# Patient Record
Sex: FEMALE | Race: WHITE | NOT HISPANIC OR LATINO | Employment: OTHER | URBAN - METROPOLITAN AREA
[De-identification: names, ages, dates, MRNs, and addresses within clinical notes are randomized per-mention and may not be internally consistent; named-entity substitution may affect disease eponyms.]

---

## 2017-10-25 ENCOUNTER — TRANSCRIBE ORDERS (OUTPATIENT)
Dept: ADMINISTRATIVE | Facility: HOSPITAL | Age: 62
End: 2017-10-25

## 2017-10-25 DIAGNOSIS — Z12.31 VISIT FOR SCREENING MAMMOGRAM: Primary | ICD-10-CM

## 2017-11-10 ENCOUNTER — HOSPITAL ENCOUNTER (OUTPATIENT)
Dept: MAMMOGRAPHY | Facility: HOSPITAL | Age: 62
Discharge: HOME | End: 2017-11-10
Attending: INTERNAL MEDICINE | Admitting: INTERNAL MEDICINE

## 2017-11-10 DIAGNOSIS — Z12.31 VISIT FOR SCREENING MAMMOGRAM: ICD-10-CM

## 2017-11-10 PROCEDURE — G0202 SCR MAMMO BI INCL CAD: HCPCS

## 2017-11-10 PROCEDURE — 77063 BREAST TOMOSYNTHESIS BI: CPT

## 2017-11-13 PROCEDURE — 77067 SCR MAMMO BI INCL CAD: CPT | Performed by: RADIOLOGY

## 2017-11-13 PROCEDURE — 77063 BREAST TOMOSYNTHESIS BI: CPT | Performed by: RADIOLOGY

## 2018-10-09 ENCOUNTER — TRANSCRIBE ORDERS (OUTPATIENT)
Dept: ADMINISTRATIVE | Facility: HOSPITAL | Age: 63
End: 2018-10-09

## 2018-10-09 DIAGNOSIS — Z12.31 VISIT FOR SCREENING MAMMOGRAM: Primary | ICD-10-CM

## 2018-11-13 ENCOUNTER — HOSPITAL ENCOUNTER (OUTPATIENT)
Dept: MAMMOGRAPHY | Facility: HOSPITAL | Age: 63
Discharge: HOME | End: 2018-11-13
Attending: OBSTETRICS & GYNECOLOGY | Admitting: OBSTETRICS & GYNECOLOGY

## 2018-11-13 DIAGNOSIS — Z12.31 VISIT FOR SCREENING MAMMOGRAM: ICD-10-CM

## 2018-11-13 PROCEDURE — 77063 BREAST TOMOSYNTHESIS BI: CPT

## 2018-11-13 PROCEDURE — 77067 SCR MAMMO BI INCL CAD: CPT

## 2018-11-13 PROCEDURE — 77067 SCR MAMMO BI INCL CAD: CPT | Performed by: RADIOLOGY

## 2018-11-13 PROCEDURE — 77063 BREAST TOMOSYNTHESIS BI: CPT | Performed by: RADIOLOGY

## 2019-02-15 ENCOUNTER — TELEPHONE (OUTPATIENT)
Dept: INTERNAL MEDICINE | Facility: CLINIC | Age: 64
End: 2019-02-15

## 2019-02-15 DIAGNOSIS — R73.03 PREDIABETES: ICD-10-CM

## 2019-02-15 DIAGNOSIS — E78.2 MIXED HYPERLIPIDEMIA: ICD-10-CM

## 2019-02-15 DIAGNOSIS — I10 BENIGN ESSENTIAL HYPERTENSION: Primary | ICD-10-CM

## 2019-02-15 NOTE — TELEPHONE ENCOUNTER
Reason for Call: FUTURE LABS    Symptoms:     Onset (when it began):    Have they tried anything?    Other pertinent info:  PT HAS PHYSICAL ON 02/22/19

## 2019-02-20 PROBLEM — F51.01 PRIMARY INSOMNIA: Status: ACTIVE | Noted: 2019-02-20

## 2019-02-20 PROBLEM — J45.909 ASTHMA: Status: ACTIVE | Noted: 2019-02-20

## 2019-02-20 PROBLEM — L65.9 LOSS OF HAIR: Status: ACTIVE | Noted: 2019-02-20

## 2019-02-20 PROBLEM — J30.9 AR (ALLERGIC RHINITIS): Status: ACTIVE | Noted: 2019-02-20

## 2019-02-20 PROBLEM — E78.2 MIXED HYPERLIPIDEMIA: Status: ACTIVE | Noted: 2019-02-20

## 2019-02-20 PROBLEM — T78.40XA ALLERGIC REACTION: Status: ACTIVE | Noted: 2019-02-20

## 2019-02-20 PROBLEM — I10 BENIGN ESSENTIAL HYPERTENSION: Status: ACTIVE | Noted: 2019-02-20

## 2019-02-20 PROBLEM — F32.A DEPRESSIVE DISORDER: Status: ACTIVE | Noted: 2019-02-20

## 2019-02-22 ENCOUNTER — OFFICE VISIT (OUTPATIENT)
Dept: INTERNAL MEDICINE | Facility: CLINIC | Age: 64
End: 2019-02-22

## 2019-02-22 ENCOUNTER — TELEPHONE (OUTPATIENT)
Dept: INTERNAL MEDICINE | Facility: CLINIC | Age: 64
End: 2019-02-22

## 2019-02-22 VITALS
HEART RATE: 64 BPM | SYSTOLIC BLOOD PRESSURE: 130 MMHG | HEIGHT: 62 IN | DIASTOLIC BLOOD PRESSURE: 80 MMHG | TEMPERATURE: 98.3 F | WEIGHT: 131 LBS | BODY MASS INDEX: 24.11 KG/M2

## 2019-02-22 DIAGNOSIS — R73.03 PREDIABETES: ICD-10-CM

## 2019-02-22 DIAGNOSIS — E78.2 MIXED HYPERLIPIDEMIA: ICD-10-CM

## 2019-02-22 DIAGNOSIS — I10 BENIGN ESSENTIAL HYPERTENSION: ICD-10-CM

## 2019-02-22 DIAGNOSIS — F51.01 PRIMARY INSOMNIA: ICD-10-CM

## 2019-02-22 DIAGNOSIS — Z12.11 COLON CANCER SCREENING: ICD-10-CM

## 2019-02-22 DIAGNOSIS — R73.9 HYPERGLYCEMIA: ICD-10-CM

## 2019-02-22 DIAGNOSIS — F32.A DEPRESSIVE DISORDER: ICD-10-CM

## 2019-02-22 DIAGNOSIS — Z00.00 PREVENTATIVE HEALTH CARE: Primary | ICD-10-CM

## 2019-02-22 LAB
ALBUMIN SERPL-MCNC: 4.81 G/DL (ref 3.2–4.8)
ALBUMIN/GLOB SERPL: 2.2 G/DL (ref 1.5–2.5)
ALP SERPL-CCNC: 91 U/L (ref 25–100)
ALT SERPL W P-5'-P-CCNC: 26 U/L (ref 7–40)
ANION GAP SERPL CALCULATED.3IONS-SCNC: 10 MMOL/L (ref 3–11)
ARTICHOKE IGE QN: 146 MG/DL (ref 0–130)
AST SERPL-CCNC: 21 U/L (ref 0–33)
BASOPHILS # BLD AUTO: 0.06 10*3/MM3 (ref 0–0.2)
BASOPHILS NFR BLD AUTO: 1.8 % (ref 0–1)
BILIRUB SERPL-MCNC: 1 MG/DL (ref 0.3–1.2)
BUN BLD-MCNC: 10 MG/DL (ref 9–23)
BUN/CREAT SERPL: 14.3 (ref 7–25)
CALCIUM SPEC-SCNC: 9.7 MG/DL (ref 8.7–10.4)
CHLORIDE SERPL-SCNC: 95 MMOL/L (ref 99–109)
CHOLEST SERPL-MCNC: 228 MG/DL (ref 0–200)
CO2 SERPL-SCNC: 28 MMOL/L (ref 20–31)
CREAT BLD-MCNC: 0.7 MG/DL (ref 0.6–1.3)
DEPRECATED RDW RBC AUTO: 48 FL (ref 37–54)
EOSINOPHIL # BLD AUTO: 0.13 10*3/MM3 (ref 0–0.3)
EOSINOPHIL NFR BLD AUTO: 4 % (ref 0–3)
ERYTHROCYTE [DISTWIDTH] IN BLOOD BY AUTOMATED COUNT: 13.5 % (ref 11.3–14.5)
GFR SERPL CREATININE-BSD FRML MDRD: 85 ML/MIN/1.73
GLOBULIN UR ELPH-MCNC: 2.2 GM/DL
GLUCOSE BLD-MCNC: 106 MG/DL (ref 70–100)
HBA1C MFR BLD: 4.9 % (ref 4.8–5.6)
HCT VFR BLD AUTO: 43.5 % (ref 34.5–44)
HDLC SERPL-MCNC: 74 MG/DL (ref 40–60)
HGB BLD-MCNC: 14.4 G/DL (ref 11.5–15.5)
IMM GRANULOCYTES # BLD AUTO: 0.01 10*3/MM3 (ref 0–0.05)
IMM GRANULOCYTES NFR BLD AUTO: 0.3 % (ref 0–0.6)
LYMPHOCYTES # BLD AUTO: 1.08 10*3/MM3 (ref 0.6–4.8)
LYMPHOCYTES NFR BLD AUTO: 32.9 % (ref 24–44)
MCH RBC QN AUTO: 32.1 PG (ref 27–31)
MCHC RBC AUTO-ENTMCNC: 33.1 G/DL (ref 32–36)
MCV RBC AUTO: 96.9 FL (ref 80–99)
MONOCYTES # BLD AUTO: 0.36 10*3/MM3 (ref 0–1)
MONOCYTES NFR BLD AUTO: 11 % (ref 0–12)
NEUTROPHILS # BLD AUTO: 1.65 10*3/MM3 (ref 1.5–8.3)
NEUTROPHILS NFR BLD AUTO: 50.3 % (ref 41–71)
PLATELET # BLD AUTO: 362 10*3/MM3 (ref 150–450)
PMV BLD AUTO: 9.5 FL (ref 6–12)
POTASSIUM BLD-SCNC: 4.1 MMOL/L (ref 3.5–5.5)
PROT SERPL-MCNC: 7 G/DL (ref 5.7–8.2)
RBC # BLD AUTO: 4.49 10*6/MM3 (ref 3.89–5.14)
SODIUM BLD-SCNC: 133 MMOL/L (ref 132–146)
TRIGL SERPL-MCNC: 65 MG/DL (ref 0–150)
WBC NRBC COR # BLD: 3.28 10*3/MM3 (ref 3.5–10.8)

## 2019-02-22 PROCEDURE — 85025 COMPLETE CBC W/AUTO DIFF WBC: CPT | Performed by: INTERNAL MEDICINE

## 2019-02-22 PROCEDURE — 83036 HEMOGLOBIN GLYCOSYLATED A1C: CPT | Performed by: INTERNAL MEDICINE

## 2019-02-22 PROCEDURE — 82570 ASSAY OF URINE CREATININE: CPT | Performed by: INTERNAL MEDICINE

## 2019-02-22 PROCEDURE — 99396 PREV VISIT EST AGE 40-64: CPT | Performed by: INTERNAL MEDICINE

## 2019-02-22 PROCEDURE — 36415 COLL VENOUS BLD VENIPUNCTURE: CPT | Performed by: INTERNAL MEDICINE

## 2019-02-22 PROCEDURE — 80053 COMPREHEN METABOLIC PANEL: CPT | Performed by: INTERNAL MEDICINE

## 2019-02-22 PROCEDURE — 80061 LIPID PANEL: CPT | Performed by: INTERNAL MEDICINE

## 2019-02-22 PROCEDURE — 82043 UR ALBUMIN QUANTITATIVE: CPT | Performed by: INTERNAL MEDICINE

## 2019-02-22 RX ORDER — ZOLPIDEM TARTRATE 10 MG/1
TABLET ORAL
COMMUNITY
Start: 2018-11-13 | End: 2019-02-22 | Stop reason: SDUPTHER

## 2019-02-22 RX ORDER — FLUOXETINE HYDROCHLORIDE 20 MG/1
1 CAPSULE ORAL DAILY
COMMUNITY
Start: 2018-09-18 | End: 2019-02-22 | Stop reason: SDUPTHER

## 2019-02-22 RX ORDER — FEXOFENADINE HCL 180 MG/1
1 TABLET ORAL DAILY
COMMUNITY
Start: 2017-07-20

## 2019-02-22 RX ORDER — CONJUGATED ESTROGENS 0.62 MG/G
1 CREAM VAGINAL 2 TIMES WEEKLY
COMMUNITY
Start: 2019-01-14 | End: 2023-02-25 | Stop reason: SDUPTHER

## 2019-02-22 RX ORDER — FLUOXETINE HYDROCHLORIDE 20 MG/1
20 CAPSULE ORAL DAILY
Qty: 30 CAPSULE | Refills: 11 | Status: SHIPPED | OUTPATIENT
Start: 2019-02-22 | End: 2020-02-24

## 2019-02-22 RX ORDER — ALBUTEROL SULFATE 90 UG/1
2 AEROSOL, METERED RESPIRATORY (INHALATION) EVERY 6 HOURS PRN
Qty: 1 INHALER | Refills: 5 | Status: SHIPPED | OUTPATIENT
Start: 2019-02-22 | End: 2020-03-25 | Stop reason: SDUPTHER

## 2019-02-22 RX ORDER — LISINOPRIL 10 MG/1
TABLET ORAL
COMMUNITY
Start: 2018-12-17 | End: 2019-02-22 | Stop reason: SDUPTHER

## 2019-02-22 RX ORDER — ZOLPIDEM TARTRATE 10 MG/1
5 TABLET ORAL NIGHTLY PRN
Qty: 30 TABLET | Refills: 2 | Status: SHIPPED | OUTPATIENT
Start: 2019-02-22 | End: 2019-08-27

## 2019-02-22 RX ORDER — ALPRAZOLAM 0.5 MG
TABLET ORAL
COMMUNITY
Start: 2018-11-13 | End: 2019-02-22 | Stop reason: SDUPTHER

## 2019-02-22 RX ORDER — LISINOPRIL 10 MG/1
10 TABLET ORAL DAILY
Qty: 30 TABLET | Refills: 11 | Status: SHIPPED | OUTPATIENT
Start: 2019-02-22 | End: 2020-02-13 | Stop reason: SDUPTHER

## 2019-02-22 RX ORDER — ALBUTEROL SULFATE 90 UG/1
AEROSOL, METERED RESPIRATORY (INHALATION)
COMMUNITY
Start: 2018-06-25 | End: 2019-02-22 | Stop reason: SDUPTHER

## 2019-02-22 RX ORDER — ALPRAZOLAM 0.5 MG/1
TABLET ORAL
Qty: 60 TABLET | Refills: 2 | Status: SHIPPED | OUTPATIENT
Start: 2019-02-22 | End: 2020-01-15 | Stop reason: SDUPTHER

## 2019-02-22 RX ORDER — HYDROCHLOROTHIAZIDE 25 MG/1
25 TABLET ORAL DAILY
Qty: 30 TABLET | Refills: 11 | Status: SHIPPED | OUTPATIENT
Start: 2019-02-22 | End: 2020-02-06

## 2019-02-22 RX ORDER — HYDROCHLOROTHIAZIDE 25 MG/1
TABLET ORAL
COMMUNITY
Start: 2018-04-23 | End: 2019-02-22 | Stop reason: SDUPTHER

## 2019-02-22 NOTE — PROGRESS NOTES
[facility]     Xochilt Vasquez  1955   [id number]      Patient Care Team:  Dilip Carrero MD as PCP - General  Dilip Carrero MD as PCP - Family Medicine    Chief Complaint::   Chief Complaint   Patient presents with   • Annual Exam     fasting, hypertension, hyperlipidemia        HPI  Mrs. Vasquez is now 63.  She comes in for follow-up of her hypertension, hyperlipidemia, depression and for annual examination.  Overall she is doing well.  She stays active but is not exercising.  She is trying to wean herself off zolpidem, and is using Benadryl and 1/2 tablet of zolpidem.  Her anxiety and depression appear well controlled and she has no dyspnea or wheezing.  She experienced vaginal bleeding a few weeks ago and was evaluated by GYN, including pelvic ultrasound and endometrial biopsy which were negative.  He is now on vaginal estrogen.    Chronic Conditions:      Patient Active Problem List   Diagnosis   • Allergic reaction   • AR (allergic rhinitis)   • Asthma   • Benign essential hypertension   • Depressive disorder   • Loss of hair   • Mixed hyperlipidemia   • Primary insomnia        No past medical history on file.    Past Surgical History:   Procedure Laterality Date   • AUGMENTATION MAMMAPLASTY Bilateral 1988   • BREAST EXCISIONAL BIOPSY Right 1994       Family History   Problem Relation Age of Onset   • Ovarian cancer Neg Hx    • Breast cancer Neg Hx        Social History     Socioeconomic History   • Marital status:      Spouse name: Not on file   • Number of children: Not on file   • Years of education: Not on file   • Highest education level: Not on file   Social Needs   • Financial resource strain: Not on file   • Food insecurity - worry: Not on file   • Food insecurity - inability: Not on file   • Transportation needs - medical: Not on file   • Transportation needs - non-medical: Not on file   Occupational History   • Not on file   Tobacco Use   • Smoking status:  Former Smoker     Last attempt to quit: 2012     Years since quittin.0   • Smokeless tobacco: Never Used   • Tobacco comment: 0.1 PPD for 20 years Quit 2012   Substance and Sexual Activity   • Alcohol use: Yes     Comment: moderate   • Drug use: Defer   • Sexual activity: No   Other Topics Concern   • Not on file   Social History Narrative   • Not on file       No Known Allergies      Current Outpatient Medications:   •  albuterol sulfate HFA (PROAIR HFA) 108 (90 Base) MCG/ACT inhaler, Inhale 2 puffs Every 6 (Six) Hours As Needed for Wheezing., Disp: 1 inhaler, Rfl: 5  •  ALPRAZolam (XANAX) 0.5 MG tablet, Take 1 tablet twice a day as needed for anxiety, Disp: 60 tablet, Rfl: 2  •  Calcium Carb-Cholecalciferol (CALCIUM 1000 + D) 1000-800 MG-UNIT tablet, Take  by mouth., Disp: , Rfl:   •  fexofenadine (ALLEGRA) 180 MG tablet, Take 1 tablet by mouth Daily., Disp: , Rfl:   •  FLUoxetine (PROZAC) 20 MG capsule, Take 1 capsule by mouth Daily., Disp: 30 capsule, Rfl: 11  •  fluticasone-salmeterol (ADVAIR DISKUS) 100-50 MCG/DOSE DISKUS, Inhale 1 puff 2 (Two) Times a Day., Disp: 1 each, Rfl: 11  •  hydrochlorothiazide (HYDRODIURIL) 25 MG tablet, Take 1 tablet by mouth Daily., Disp: 30 tablet, Rfl: 11  •  lisinopril (PRINIVIL,ZESTRIL) 10 MG tablet, Take 1 tablet by mouth Daily., Disp: 30 tablet, Rfl: 11  •  Multiple Vitamins-Minerals (MULTI FOR HER 50+ PO), Take  by mouth., Disp: , Rfl:   •  PREMARIN 0.625 MG/GM vaginal cream, , Disp: , Rfl:   •  zolpidem (AMBIEN) 10 MG tablet, Take 0.5 tablets by mouth At Night As Needed for Sleep., Disp: 30 tablet, Rfl: 2    Review of Systems   Constitutional: Negative for activity change, chills, fatigue, fever, unexpected weight gain and unexpected weight loss.   HENT: Negative for congestion, ear pain, hearing loss, postnasal drip, sinus pressure, trouble swallowing and voice change.    Eyes: Negative for blurred vision, double vision and visual disturbance.   Respiratory:  "Negative for cough and shortness of breath.    Cardiovascular: Negative for chest pain, palpitations and leg swelling.   Gastrointestinal: Negative for abdominal pain, blood in stool, constipation, diarrhea, nausea, vomiting and indigestion.   Endocrine: Negative for cold intolerance, heat intolerance, polydipsia and polyuria.   Genitourinary: Negative for breast discharge, urinary incontinence, decreased libido, dysuria, menstrual problem, urgency, vaginal bleeding, vaginal discharge and breast lump.   Musculoskeletal: Negative for back pain, joint swelling and myalgias.   Skin: Negative for skin lesions.   Allergic/Immunologic: Negative for environmental allergies.   Neurological: Negative for dizziness, syncope, speech difficulty, weakness, light-headedness, numbness, headache, memory problem and confusion.   Hematological: Negative for adenopathy. Does not bruise/bleed easily.   Psychiatric/Behavioral: Negative for agitation, behavioral problems, decreased concentration, sleep disturbance, depressed mood and stress. The patient is not nervous/anxious.         Vital Signs  Vitals:    02/22/19 1055   BP: 130/80   BP Location: Left arm   Patient Position: Sitting   Cuff Size: Adult   Pulse: 64   Temp: 98.3 °F (36.8 °C)   TempSrc: Temporal   Weight: 59.4 kg (131 lb)  Comment: without shoes   Height: 158.5 cm (62.4\")   PainSc: 0-No pain       Physical Exam   Constitutional: She is oriented to person, place, and time. She appears well-developed and well-nourished.   HENT:   Head: Normocephalic and atraumatic.   Right Ear: External ear normal.   Left Ear: External ear normal.   Nose: Nose normal.   Mouth/Throat: Oropharynx is clear and moist. No oropharyngeal exudate.   Eyes: Conjunctivae and EOM are normal. Pupils are equal, round, and reactive to light.   Neck: Normal range of motion. Neck supple. No JVD present. No thyromegaly present.   Cardiovascular: Normal rate, regular rhythm, normal heart sounds and intact " distal pulses. Exam reveals no gallop and no friction rub.   No murmur heard.  Pulmonary/Chest: Effort normal and breath sounds normal. No respiratory distress. She has no wheezes. She has no rales.   Abdominal: Soft. Bowel sounds are normal. She exhibits no distension and no mass. There is no tenderness. There is no rebound and no guarding. No hernia.   Musculoskeletal: Normal range of motion. She exhibits no edema or tenderness.   Lymphadenopathy:     She has no cervical adenopathy.   Neurological: She is alert and oriented to person, place, and time. She displays normal reflexes. No cranial nerve deficit or sensory deficit. She exhibits normal muscle tone. Coordination normal.   Skin: Skin is warm and dry. No rash noted. No erythema.   Psychiatric: She has a normal mood and affect. Her behavior is normal. Judgment and thought content normal.   Nursing note and vitals reviewed.     Procedures      Assessment/Plan:    #1 preventive care, she remains in very good health, but needs to improve physical exercise to maintain strength and stamina.  Is up-to-date on GYN care and mammography.  Colonoscopy was done in 2012, due at 5 year, scheduled for this year.   #2 hypertension well controlled  #3 hyperlipidemia results pending  #4  insomnia, I encouraged her to continue working on weaning zolpidem as she gets older this will become more problematic.  Benadryl is safe, and melatonin up to 10 mg is safe as well.  #5 depression stable on fluoxetine, she uses alprazolam rarely.    Counseling was given regarding regular exercise, 150 minutes per week, healthy diet, disease prevention, management of anxiety, stress and depression.     Plan of care reviewed with patient at the conclusion of today's visit. Education was provided regarding diagnosis, management, and any prescribed or recommended OTC medications.Patient verbalizes understanding of and agreement with management plan.         Dilip Carrero MD

## 2019-02-22 NOTE — TELEPHONE ENCOUNTER
Rx sent for ambien 10mg 1/2 tablet daily.   Pharmacy wants to make sure you meant to decrease to 1/2 tablet.     Previously was taking 1/2-1 tablet qhs.

## 2019-02-22 NOTE — PROGRESS NOTES
[facility]     Xochilt Vasquez  1955   [id number]      Patient Care Team:  Dilip Carrero MD as PCP - General  Dilip Carrero MD as PCP - Family Medicine    Chief Complaint::   Chief Complaint   Patient presents with   • Annual Exam     fasting, hypertension, hyperlipidemia        HPI  ***    Chronic Conditions:  ***    Patient Active Problem List   Diagnosis   • Allergic reaction   • AR (allergic rhinitis)   • Asthma   • Benign essential hypertension   • Depressive disorder   • Loss of hair   • Mixed hyperlipidemia   • Primary insomnia        No past medical history on file.    Past Surgical History:   Procedure Laterality Date   • AUGMENTATION MAMMAPLASTY Bilateral    • BREAST EXCISIONAL BIOPSY Right        Family History   Problem Relation Age of Onset   • Ovarian cancer Neg Hx    • Breast cancer Neg Hx        Social History     Socioeconomic History   • Marital status:      Spouse name: Not on file   • Number of children: Not on file   • Years of education: Not on file   • Highest education level: Not on file   Social Needs   • Financial resource strain: Not on file   • Food insecurity - worry: Not on file   • Food insecurity - inability: Not on file   • Transportation needs - medical: Not on file   • Transportation needs - non-medical: Not on file   Occupational History   • Not on file   Tobacco Use   • Smoking status: Former Smoker     Last attempt to quit: 2012     Years since quittin.0   • Smokeless tobacco: Never Used   • Tobacco comment: 0.1 PPD for 20 years Quit 2012   Substance and Sexual Activity   • Alcohol use: Yes     Comment: moderate   • Drug use: Defer   • Sexual activity: No   Other Topics Concern   • Not on file   Social History Narrative   • Not on file       No Known Allergies      Current Outpatient Medications:   •  albuterol sulfate HFA (PROAIR HFA) 108 (90 Base) MCG/ACT inhaler, Inhale., Disp: , Rfl:   •  ALPRAZolam (XANAX)  "0.5 MG tablet, Take  by mouth., Disp: , Rfl:   •  Calcium Carb-Cholecalciferol (CALCIUM 1000 + D) 1000-800 MG-UNIT tablet, Take  by mouth., Disp: , Rfl:   •  fexofenadine (ALLEGRA) 180 MG tablet, Take 1 tablet by mouth Daily., Disp: , Rfl:   •  FLUoxetine (PROZAC) 20 MG capsule, Take 1 capsule by mouth Daily., Disp: , Rfl:   •  fluticasone-salmeterol (ADVAIR DISKUS) 100-50 MCG/DOSE DISKUS, Inhale., Disp: , Rfl:   •  hydrochlorothiazide (HYDRODIURIL) 25 MG tablet, Take  by mouth., Disp: , Rfl:   •  lisinopril (PRINIVIL,ZESTRIL) 10 MG tablet, Take  by mouth., Disp: , Rfl:   •  Multiple Vitamins-Minerals (MULTI FOR HER 50+ PO), Take  by mouth., Disp: , Rfl:   •  PREMARIN 0.625 MG/GM vaginal cream, , Disp: , Rfl:   •  zolpidem (AMBIEN) 10 MG tablet, Take  by mouth., Disp: , Rfl:     Review of Systems     Vital Signs  Vitals:    02/22/19 1055   BP: 130/80   BP Location: Left arm   Patient Position: Sitting   Cuff Size: Adult   Pulse: 64   Temp: 98.3 °F (36.8 °C)   TempSrc: Temporal   Weight: 59.4 kg (131 lb)  Comment: without shoes   Height: 158.5 cm (62.4\")   PainSc: 0-No pain       Physical Exam   Procedures      Assessment/Plan:  ***    Plan of care reviewed with patient at the conclusion of today's visit. Education was provided regarding diagnosis, management, and any prescribed or recommended OTC medications.Patient verbalizes understanding of and agreement with management plan.         Sylvia Jiang MA           "

## 2019-02-23 LAB
CREAT 24H UR-MCNC: 149.7 MG/DL
MICROALBUMIN UR-MCNC: 6.1 UG/ML
MICROALBUMIN/CREAT UR: 4.1 MG/G CREAT (ref 0–30)

## 2019-02-23 NOTE — PROGRESS NOTES
[facility]     Xochilt Vasquez  1955   [id number]      Patient Care Team:  Dilip Carrero MD as PCP - General  iDlip Carrero MD as PCP - Family Medicine    Chief Complaint::   Chief Complaint   Patient presents with   • Annual Exam     fasting, hypertension, hyperlipidemia        HPI  ***    Chronic Conditions:  ***    Patient Active Problem List   Diagnosis   • Allergic reaction   • AR (allergic rhinitis)   • Asthma   • Benign essential hypertension   • Depressive disorder   • Loss of hair   • Mixed hyperlipidemia   • Primary insomnia        No past medical history on file.    Past Surgical History:   Procedure Laterality Date   • AUGMENTATION MAMMAPLASTY Bilateral    • BREAST EXCISIONAL BIOPSY Right        Family History   Problem Relation Age of Onset   • Ovarian cancer Neg Hx    • Breast cancer Neg Hx        Social History     Socioeconomic History   • Marital status:      Spouse name: Not on file   • Number of children: Not on file   • Years of education: Not on file   • Highest education level: Not on file   Social Needs   • Financial resource strain: Not on file   • Food insecurity - worry: Not on file   • Food insecurity - inability: Not on file   • Transportation needs - medical: Not on file   • Transportation needs - non-medical: Not on file   Occupational History   • Not on file   Tobacco Use   • Smoking status: Former Smoker     Last attempt to quit: 2012     Years since quittin.0   • Smokeless tobacco: Never Used   • Tobacco comment: 0.1 PPD for 20 years Quit 2012   Substance and Sexual Activity   • Alcohol use: Yes     Comment: moderate   • Drug use: Defer   • Sexual activity: No   Other Topics Concern   • Not on file   Social History Narrative   • Not on file       No Known Allergies      Current Outpatient Medications:   •  albuterol sulfate HFA (PROAIR HFA) 108 (90 Base) MCG/ACT inhaler, Inhale 2 puffs Every 6 (Six) Hours As Needed  "for Wheezing., Disp: 1 inhaler, Rfl: 5  •  ALPRAZolam (XANAX) 0.5 MG tablet, Take 1 tablet twice a day as needed for anxiety, Disp: 60 tablet, Rfl: 2  •  Calcium Carb-Cholecalciferol (CALCIUM 1000 + D) 1000-800 MG-UNIT tablet, Take  by mouth., Disp: , Rfl:   •  fexofenadine (ALLEGRA) 180 MG tablet, Take 1 tablet by mouth Daily., Disp: , Rfl:   •  FLUoxetine (PROZAC) 20 MG capsule, Take 1 capsule by mouth Daily., Disp: 30 capsule, Rfl: 11  •  fluticasone-salmeterol (ADVAIR DISKUS) 100-50 MCG/DOSE DISKUS, Inhale 1 puff 2 (Two) Times a Day., Disp: 1 each, Rfl: 11  •  hydrochlorothiazide (HYDRODIURIL) 25 MG tablet, Take 1 tablet by mouth Daily., Disp: 30 tablet, Rfl: 11  •  lisinopril (PRINIVIL,ZESTRIL) 10 MG tablet, Take 1 tablet by mouth Daily., Disp: 30 tablet, Rfl: 11  •  Multiple Vitamins-Minerals (MULTI FOR HER 50+ PO), Take  by mouth., Disp: , Rfl:   •  PREMARIN 0.625 MG/GM vaginal cream, , Disp: , Rfl:   •  zolpidem (AMBIEN) 10 MG tablet, Take 0.5 tablets by mouth At Night As Needed for Sleep., Disp: 30 tablet, Rfl: 2    Immunization History   Administered Date(s) Administered   • Influenza, Unspecified 09/29/2017        Health Maintenance Due   Topic Date Due   • ANNUAL PHYSICAL  06/23/1958   • TDAP/TD VACCINES (1 - Tdap) 06/23/1974   • ZOSTER VACCINE (1 of 2) 06/23/2005   • HEPATITIS C SCREENING  08/26/2016   • COLONOSCOPY  08/26/2016   • LIPID PANEL  02/15/2019        Review of Systems     Vital Signs  Vitals:    02/22/19 1055   BP: 130/80   BP Location: Left arm   Patient Position: Sitting   Cuff Size: Adult   Pulse: 64   Temp: 98.3 °F (36.8 °C)   TempSrc: Temporal   Weight: 59.4 kg (131 lb)  Comment: without shoes   Height: 158.5 cm (62.4\")   PainSc: 0-No pain       Physical Exam   Procedures      Assessment/Plan:  ***      Labs  Results for orders placed or performed in visit on 02/22/19   Comprehensive Metabolic Panel   Result Value Ref Range    Glucose 106 (H) 70 - 100 mg/dL    BUN 10 9 - 23 mg/dL    " Creatinine 0.70 0.60 - 1.30 mg/dL    Sodium 133 132 - 146 mmol/L    Potassium 4.1 3.5 - 5.5 mmol/L    Chloride 95 (L) 99 - 109 mmol/L    CO2 28.0 20.0 - 31.0 mmol/L    Calcium 9.7 8.7 - 10.4 mg/dL    Total Protein 7.0 5.7 - 8.2 g/dL    Albumin 4.81 (H) 3.20 - 4.80 g/dL    ALT (SGPT) 26 7 - 40 U/L    AST (SGOT) 21 0 - 33 U/L    Alkaline Phosphatase 91 25 - 100 U/L    Total Bilirubin 1.0 0.3 - 1.2 mg/dL    eGFR Non African Amer 85 >60 mL/min/1.73    Globulin 2.2 gm/dL    A/G Ratio 2.2 1.5 - 2.5 g/dL    BUN/Creatinine Ratio 14.3 7.0 - 25.0    Anion Gap 10.0 3.0 - 11.0 mmol/L   Lipid Panel   Result Value Ref Range    Total Cholesterol 228 (H) 0 - 200 mg/dL    Triglycerides 65 0 - 150 mg/dL    HDL Cholesterol 74 (H) 40 - 60 mg/dL    LDL Cholesterol  146 (H) 0 - 130 mg/dL   Hemoglobin A1c   Result Value Ref Range    Hemoglobin A1C 4.90 4.80 - 5.60 %   CBC Auto Differential   Result Value Ref Range    WBC 3.28 (L) 3.50 - 10.80 10*3/mm3    RBC 4.49 3.89 - 5.14 10*6/mm3    Hemoglobin 14.4 11.5 - 15.5 g/dL    Hematocrit 43.5 34.5 - 44.0 %    MCV 96.9 80.0 - 99.0 fL    MCH 32.1 (H) 27.0 - 31.0 pg    MCHC 33.1 32.0 - 36.0 g/dL    RDW 13.5 11.3 - 14.5 %    RDW-SD 48.0 37.0 - 54.0 fl    MPV 9.5 6.0 - 12.0 fL    Platelets 362 150 - 450 10*3/mm3    Neutrophil % 50.3 41.0 - 71.0 %    Lymphocyte % 32.9 24.0 - 44.0 %    Monocyte % 11.0 0.0 - 12.0 %    Eosinophil % 4.0 (H) 0.0 - 3.0 %    Basophil % 1.8 (H) 0.0 - 1.0 %    Immature Grans % 0.3 0.0 - 0.6 %    Neutrophils, Absolute 1.65 1.50 - 8.30 10*3/mm3    Lymphocytes, Absolute 1.08 0.60 - 4.80 10*3/mm3    Monocytes, Absolute 0.36 0.00 - 1.00 10*3/mm3    Eosinophils, Absolute 0.13 0.00 - 0.30 10*3/mm3    Basophils, Absolute 0.06 0.00 - 0.20 10*3/mm3    Immature Grans, Absolute 0.01 0.00 - 0.05 10*3/mm3        Counseling was given to {person:[id number]} for the following topics: {[facility] Counseling Topics:[id number]}.    Plan of care reviewed with patient at the conclusion of today's visit.  Education was provided regarding diagnosis, management, and any prescribed or recommended OTC medications.Patient verbalizes understanding of and agreement with management plan.         Dilip Carrero MD

## 2019-03-21 ENCOUNTER — LAB REQUISITION (OUTPATIENT)
Dept: LAB | Facility: HOSPITAL | Age: 64
End: 2019-03-21

## 2019-03-21 ENCOUNTER — OUTSIDE FACILITY SERVICE (OUTPATIENT)
Dept: GASTROENTEROLOGY | Facility: CLINIC | Age: 64
End: 2019-03-21

## 2019-03-21 DIAGNOSIS — Z86.010 HISTORY OF COLONIC POLYPS: ICD-10-CM

## 2019-03-21 DIAGNOSIS — Z12.11 ENCOUNTER FOR SCREENING FOR MALIGNANT NEOPLASM OF COLON: ICD-10-CM

## 2019-03-21 PROCEDURE — 45385 COLONOSCOPY W/LESION REMOVAL: CPT | Performed by: INTERNAL MEDICINE

## 2019-03-21 PROCEDURE — 88305 TISSUE EXAM BY PATHOLOGIST: CPT | Performed by: INTERNAL MEDICINE

## 2019-03-22 LAB
CYTO UR: NORMAL
LAB AP CASE REPORT: NORMAL
LAB AP CLINICAL INFORMATION: NORMAL
PATH REPORT.FINAL DX SPEC: NORMAL
PATH REPORT.GROSS SPEC: NORMAL

## 2019-08-27 ENCOUNTER — LAB (OUTPATIENT)
Dept: LAB | Facility: HOSPITAL | Age: 64
End: 2019-08-27

## 2019-08-27 ENCOUNTER — OFFICE VISIT (OUTPATIENT)
Dept: INTERNAL MEDICINE | Facility: CLINIC | Age: 64
End: 2019-08-27

## 2019-08-27 VITALS
SYSTOLIC BLOOD PRESSURE: 128 MMHG | DIASTOLIC BLOOD PRESSURE: 70 MMHG | HEART RATE: 62 BPM | TEMPERATURE: 98.5 F | WEIGHT: 145 LBS | HEIGHT: 62 IN | BODY MASS INDEX: 26.68 KG/M2

## 2019-08-27 DIAGNOSIS — I10 BENIGN ESSENTIAL HYPERTENSION: ICD-10-CM

## 2019-08-27 DIAGNOSIS — E78.2 MIXED HYPERLIPIDEMIA: ICD-10-CM

## 2019-08-27 DIAGNOSIS — R73.9 HYPERGLYCEMIA: ICD-10-CM

## 2019-08-27 DIAGNOSIS — I10 BENIGN ESSENTIAL HYPERTENSION: Primary | ICD-10-CM

## 2019-08-27 DIAGNOSIS — F32.5 MAJOR DEPRESSIVE DISORDER WITH SINGLE EPISODE, IN FULL REMISSION (HCC): ICD-10-CM

## 2019-08-27 LAB
ALBUMIN SERPL-MCNC: 4.7 G/DL (ref 3.5–5.2)
ALBUMIN/GLOB SERPL: 1.7 G/DL
ALP SERPL-CCNC: 86 U/L (ref 39–117)
ALT SERPL W P-5'-P-CCNC: 30 U/L (ref 1–33)
ANION GAP SERPL CALCULATED.3IONS-SCNC: 13.8 MMOL/L (ref 5–15)
AST SERPL-CCNC: 27 U/L (ref 1–32)
BILIRUB SERPL-MCNC: 1.3 MG/DL (ref 0.2–1.2)
BUN BLD-MCNC: 11 MG/DL (ref 8–23)
BUN/CREAT SERPL: 19 (ref 7–25)
CALCIUM SPEC-SCNC: 9.5 MG/DL (ref 8.6–10.5)
CHLORIDE SERPL-SCNC: 96 MMOL/L (ref 98–107)
CHOLEST SERPL-MCNC: 215 MG/DL (ref 0–200)
CO2 SERPL-SCNC: 28.2 MMOL/L (ref 22–29)
CREAT BLD-MCNC: 0.58 MG/DL (ref 0.57–1)
GFR SERPL CREATININE-BSD FRML MDRD: 105 ML/MIN/1.73
GLOBULIN UR ELPH-MCNC: 2.8 GM/DL
GLUCOSE BLD-MCNC: 105 MG/DL (ref 65–99)
HBA1C MFR BLD: 4.9 % (ref 4.8–5.6)
HDLC SERPL-MCNC: 77 MG/DL (ref 40–60)
LDLC SERPL CALC-MCNC: 110 MG/DL (ref 0–100)
LDLC/HDLC SERPL: 1.43 {RATIO}
POTASSIUM BLD-SCNC: 4.2 MMOL/L (ref 3.5–5.2)
PROT SERPL-MCNC: 7.5 G/DL (ref 6–8.5)
SODIUM BLD-SCNC: 138 MMOL/L (ref 136–145)
TRIGL SERPL-MCNC: 138 MG/DL (ref 0–150)
VLDLC SERPL-MCNC: 27.6 MG/DL (ref 5–40)

## 2019-08-27 PROCEDURE — 99213 OFFICE O/P EST LOW 20 MIN: CPT | Performed by: INTERNAL MEDICINE

## 2019-08-27 PROCEDURE — 83036 HEMOGLOBIN GLYCOSYLATED A1C: CPT

## 2019-08-27 PROCEDURE — 80053 COMPREHEN METABOLIC PANEL: CPT

## 2019-08-27 PROCEDURE — 80061 LIPID PANEL: CPT

## 2019-08-27 NOTE — PROGRESS NOTES
[facility]     Xochilt Vasquez  1955   [id number]      Patient Care Team:  Dilip Carrero MD as PCP - General  Dilip Carrero MD as PCP - Family Medicine    Chief Complaint::   Chief Complaint   Patient presents with   • Hypertension   • Hyperlipidemia     fasting   • Med Refill        OSCAR Vasquez comes in for follow-up of her hypertension, hyperlipidemia and hyperglycemia.  She feels well.  She has gained weight this summer and hopes to improve her diet to lose the weight.  She has been able to discontinue Ambien.  She takes alprazolam very infrequently.  She is using Benadryl to help her sleep, and its also helped her allergies.    Chronic Conditions:      Patient Active Problem List   Diagnosis   • Allergic reaction   • AR (allergic rhinitis)   • Asthma   • Benign essential hypertension   • Depressive disorder   • Loss of hair   • Mixed hyperlipidemia   • Primary insomnia   • Major depressive disorder with single episode, in full remission (CMS/[facility])        History reviewed. No pertinent past medical history.    Past Surgical History:   Procedure Laterality Date   • AUGMENTATION MAMMAPLASTY Bilateral    • BREAST EXCISIONAL BIOPSY Right        Family History   Problem Relation Age of Onset   • Ovarian cancer Neg Hx    • Breast cancer Neg Hx        Social History     Socioeconomic History   • Marital status:      Spouse name: Not on file   • Number of children: Not on file   • Years of education: Not on file   • Highest education level: Not on file   Tobacco Use   • Smoking status: Former Smoker     Last attempt to quit: 2012     Years since quittin.5   • Smokeless tobacco: Never Used   • Tobacco comment: 0.1 PPD for 20 years Quit 2012   Substance and Sexual Activity   • Alcohol use: Yes     Comment: moderate   • Drug use: Defer   • Sexual activity: No       No Known Allergies      Current Outpatient Medications:   •  albuterol sulfate HFA (PROAIR  "HFA) 108 (90 Base) MCG/ACT inhaler, Inhale 2 puffs Every 6 (Six) Hours As Needed for Wheezing., Disp: 1 inhaler, Rfl: 5  •  ALPRAZolam (XANAX) 0.5 MG tablet, Take 1 tablet twice a day as needed for anxiety, Disp: 60 tablet, Rfl: 2  •  Calcium Carb-Cholecalciferol (CALCIUM 1000 + D) 1000-800 MG-UNIT tablet, Take  by mouth., Disp: , Rfl:   •  fexofenadine (ALLEGRA) 180 MG tablet, Take 1 tablet by mouth Daily., Disp: , Rfl:   •  FLUoxetine (PROZAC) 20 MG capsule, Take 1 capsule by mouth Daily., Disp: 30 capsule, Rfl: 11  •  fluticasone-salmeterol (ADVAIR DISKUS) 100-50 MCG/DOSE DISKUS, Inhale 1 puff 2 (Two) Times a Day., Disp: 1 each, Rfl: 11  •  hydrochlorothiazide (HYDRODIURIL) 25 MG tablet, Take 1 tablet by mouth Daily., Disp: 30 tablet, Rfl: 11  •  lisinopril (PRINIVIL,ZESTRIL) 10 MG tablet, Take 1 tablet by mouth Daily., Disp: 30 tablet, Rfl: 11  •  Multiple Vitamins-Minerals (MULTI FOR HER 50+ PO), Take  by mouth., Disp: , Rfl:   •  PREMARIN 0.625 MG/GM vaginal cream, , Disp: , Rfl:     Review of Systems   Constitutional: Negative for chills, fatigue and fever.   HENT: Negative for congestion, ear pain and sinus pressure.    Respiratory: Negative for cough, chest tightness, shortness of breath and wheezing.    Cardiovascular: Negative for chest pain and palpitations.   Gastrointestinal: Negative for abdominal pain, blood in stool and constipation.   Skin: Negative for color change.   Allergic/Immunologic: Negative for environmental allergies.   Neurological: Negative for dizziness, speech difficulty and headache.   Psychiatric/Behavioral: Negative for decreased concentration. The patient is not nervous/anxious.         Vital Signs  Vitals:    08/27/19 1059   BP: 128/70   BP Location: Left arm   Patient Position: Sitting   Cuff Size: Adult   Pulse: 62   Temp: 98.5 °F (36.9 °C)   TempSrc: Temporal   Weight: 65.8 kg (145 lb)   Height: 158.5 cm (62.4\")   PainSc: 0-No pain       Physical Exam   Constitutional: She is " oriented to person, place, and time. She appears well-developed and well-nourished.   HENT:   Head: Normocephalic and atraumatic.   Cardiovascular: Normal rate, regular rhythm and normal heart sounds.   No murmur heard.  Pulmonary/Chest: Effort normal and breath sounds normal.   Neurological: She is alert and oriented to person, place, and time.   Psychiatric: She has a normal mood and affect.   Vitals reviewed.     Procedures    ACE III MINI             Assessment/Plan:    Xochilt was seen today for hypertension, hyperlipidemia and med refill.    Diagnoses and all orders for this visit:    Benign essential hypertension  -     Comprehensive Metabolic Panel; Future    Mixed hyperlipidemia  -     Lipid Panel; Future    Hyperglycemia  -     Hemoglobin A1c; Future    Major depressive disorder with single episode, in full remission (CMS/[facility])    Plan    Blood pressure is well controlled on lisinopril and hydrochlorothiazide.  She will reduce calories to get her weight back down.    Lipid panel is pending, continue working on healthy diet and regular exercise.    A1c pending, last A1c was under 5, but with weight gain will repeat.  Weight loss remains the treatment.    Her mood remains well compensated on fluoxetine.      Plan of care reviewed with patient at the conclusion of today's visit. Education was provided regarding diagnosis, management, and any prescribed or recommended OTC medications.Patient verbalizes understanding of and agreement with management plan.         Dilip Carrero MD

## 2019-11-06 ENCOUNTER — TRANSCRIBE ORDERS (OUTPATIENT)
Dept: ADMINISTRATIVE | Facility: HOSPITAL | Age: 64
End: 2019-11-06

## 2019-11-06 DIAGNOSIS — Z12.31 VISIT FOR SCREENING MAMMOGRAM: Primary | ICD-10-CM

## 2019-12-28 RX ORDER — OSELTAMIVIR PHOSPHATE 75 MG/1
75 CAPSULE ORAL DAILY
Qty: 10 CAPSULE | Refills: 0 | Status: SHIPPED | OUTPATIENT
Start: 2019-12-28 | End: 2020-02-06

## 2020-01-15 DIAGNOSIS — F41.9 ANXIETY: Primary | ICD-10-CM

## 2020-01-15 RX ORDER — ALPRAZOLAM 0.5 MG/1
TABLET ORAL
Qty: 60 TABLET | Refills: 2 | Status: SHIPPED | OUTPATIENT
Start: 2020-01-15 | End: 2020-06-29 | Stop reason: SDUPTHER

## 2020-01-17 ENCOUNTER — APPOINTMENT (OUTPATIENT)
Dept: MAMMOGRAPHY | Facility: HOSPITAL | Age: 65
End: 2020-01-17

## 2020-02-04 ENCOUNTER — TELEPHONE (OUTPATIENT)
Dept: INTERNAL MEDICINE | Facility: CLINIC | Age: 65
End: 2020-02-04

## 2020-02-04 RX ORDER — BENZONATATE 100 MG/1
100 CAPSULE ORAL 3 TIMES DAILY PRN
Qty: 30 CAPSULE | Refills: 0 | Status: SHIPPED | OUTPATIENT
Start: 2020-02-04 | End: 2020-06-29

## 2020-02-04 NOTE — TELEPHONE ENCOUNTER
Her symptoms sound like the typical viral illness that is going around.  Antibiotics don't help that.  I can send her in some cough medicine, but I don't think antibiotics are appropriate for what she has. I have sent in tessalon perles.

## 2020-02-04 NOTE — TELEPHONE ENCOUNTER
Called patient back to get more information.  She states she has a non-productive cough since yesterday.  Denies congestion, sore throat, fever or body aches.  She has taken OTC Robitussin without relief.  Wants antibiotic.

## 2020-02-06 ENCOUNTER — OFFICE VISIT (OUTPATIENT)
Dept: INTERNAL MEDICINE | Facility: CLINIC | Age: 65
End: 2020-02-06

## 2020-02-06 VITALS
WEIGHT: 141 LBS | HEART RATE: 80 BPM | SYSTOLIC BLOOD PRESSURE: 148 MMHG | BODY MASS INDEX: 25.95 KG/M2 | DIASTOLIC BLOOD PRESSURE: 90 MMHG | TEMPERATURE: 98.8 F | HEIGHT: 62 IN

## 2020-02-06 DIAGNOSIS — J20.9 ACUTE BRONCHITIS, UNSPECIFIED ORGANISM: Primary | ICD-10-CM

## 2020-02-06 DIAGNOSIS — J40 BRONCHITIS: ICD-10-CM

## 2020-02-06 PROCEDURE — 99212 OFFICE O/P EST SF 10 MIN: CPT | Performed by: INTERNAL MEDICINE

## 2020-02-06 PROCEDURE — 96372 THER/PROPH/DIAG INJ SC/IM: CPT | Performed by: INTERNAL MEDICINE

## 2020-02-06 RX ORDER — TRIAMCINOLONE ACETONIDE 40 MG/ML
80 INJECTION, SUSPENSION INTRA-ARTICULAR; INTRAMUSCULAR ONCE
Status: COMPLETED | OUTPATIENT
Start: 2020-02-06 | End: 2020-02-06

## 2020-02-06 RX ADMIN — TRIAMCINOLONE ACETONIDE 80 MG: 40 INJECTION, SUSPENSION INTRA-ARTICULAR; INTRAMUSCULAR at 11:51

## 2020-02-06 NOTE — PROGRESS NOTES
[facility]     Xochilt Vasquez  1955   [id number]      Patient Care Team:  Dilip Carrero MD as PCP - General  Dilip Carrero MD as PCP - Family Medicine    Chief Complaint::   Chief Complaint   Patient presents with   • Cough   • URI        HPI  Ms. Vasquez comes in complaining of a persistent cough.  She has no fevers or chills, and the cough is not productive.  She does not have postnasal drainage or congestion.  She was given Tessalon Perles yesterday which did not help.    Chronic Conditions:      Patient Active Problem List   Diagnosis   • Allergic reaction   • AR (allergic rhinitis)   • Asthma   • Benign essential hypertension   • Depressive disorder   • Loss of hair   • Mixed hyperlipidemia   • Primary insomnia   • Major depressive disorder with single episode, in full remission (CMS/[facility])        No past medical history on file.    Past Surgical History:   Procedure Laterality Date   • AUGMENTATION MAMMAPLASTY Bilateral    • BREAST EXCISIONAL BIOPSY Right        Family History   Problem Relation Age of Onset   • Ovarian cancer Neg Hx    • Breast cancer Neg Hx        Social History     Socioeconomic History   • Marital status:      Spouse name: Not on file   • Number of children: Not on file   • Years of education: Not on file   • Highest education level: Not on file   Tobacco Use   • Smoking status: Former Smoker     Last attempt to quit: 2012     Years since quittin.9   • Smokeless tobacco: Never Used   • Tobacco comment: 0.1 PPD for 20 years Quit 2012   Substance and Sexual Activity   • Alcohol use: Yes     Comment: moderate   • Drug use: Defer   • Sexual activity: Never       No Known Allergies      Current Outpatient Medications:   •  albuterol sulfate HFA (PROAIR HFA) 108 (90 Base) MCG/ACT inhaler, Inhale 2 puffs Every 6 (Six) Hours As Needed for Wheezing., Disp: 1 inhaler, Rfl: 5  •  ALPRAZolam (XANAX) 0.5 MG tablet, Take 1 tablet twice a  "day as needed for anxiety, Disp: 60 tablet, Rfl: 2  •  benzonatate (TESSALON PERLES) 100 MG capsule, Take 1 capsule by mouth 3 (Three) Times a Day As Needed for Cough., Disp: 30 capsule, Rfl: 0  •  Calcium Carb-Cholecalciferol (CALCIUM 1000 + D) 1000-800 MG-UNIT tablet, Take  by mouth., Disp: , Rfl:   •  fexofenadine (ALLEGRA) 180 MG tablet, Take 1 tablet by mouth Daily., Disp: , Rfl:   •  FLUoxetine (PROZAC) 20 MG capsule, Take 1 capsule by mouth Daily., Disp: 30 capsule, Rfl: 11  •  fluticasone-salmeterol (ADVAIR DISKUS) 100-50 MCG/DOSE DISKUS, Inhale 1 puff 2 (Two) Times a Day., Disp: 1 each, Rfl: 11  •  lisinopril (PRINIVIL,ZESTRIL) 10 MG tablet, Take 1 tablet by mouth Daily., Disp: 30 tablet, Rfl: 11  •  Multiple Vitamins-Minerals (MULTI FOR HER 50+ PO), Take  by mouth., Disp: , Rfl:   •  PREMARIN 0.625 MG/GM vaginal cream, , Disp: , Rfl:   No current facility-administered medications for this visit.     Review of Systems   Constitutional: Negative for chills, fatigue and fever.   HENT: Positive for congestion. Negative for ear pain and sinus pressure.    Respiratory: Positive for cough, chest tightness, shortness of breath and wheezing.    Cardiovascular: Negative for chest pain and palpitations.   Gastrointestinal: Negative for abdominal pain, blood in stool and constipation.   Skin: Negative for color change.   Allergic/Immunologic: Negative for environmental allergies.   Neurological: Negative for dizziness, speech difficulty and headache.   Psychiatric/Behavioral: Negative for decreased concentration. The patient is not nervous/anxious.         Vital Signs  Vitals:    02/06/20 1143   BP: 148/90   BP Location: Left arm   Patient Position: Sitting   Cuff Size: Adult   Pulse: 80   Temp: 98.8 °F (37.1 °C)   Weight: 64 kg (141 lb)   Height: 158.5 cm (62.4\")   PainSc: 0-No pain       Physical Exam   Constitutional: She is oriented to person, place, and time. She appears well-developed and well-nourished.   HENT: "   Head: Normocephalic and atraumatic.   Cardiovascular: Normal rate, regular rhythm and normal heart sounds.   No murmur heard.  Pulmonary/Chest: Effort normal. She has wheezes.   Diffuse wheezing on forced expiration   Neurological: She is alert and oriented to person, place, and time.   Psychiatric: She has a normal mood and affect.   Vitals reviewed.     Procedures    ACE III MINI             Assessment/Plan:    Xochilt was seen today for cough and uri.    Diagnoses and all orders for this visit:    Bronchitis    Plan    She has viral bronchitis.  She is given parenteral triamcinolone and will continue Tessalon Perles as needed.      Plan of care reviewed with patient at the conclusion of today's visit. Education was provided regarding diagnosis, management, and any prescribed or recommended OTC medications.Patient verbalizes understanding of and agreement with management plan.         Dilip Carrero MD

## 2020-02-10 ENCOUNTER — TELEPHONE (OUTPATIENT)
Dept: INTERNAL MEDICINE | Facility: CLINIC | Age: 65
End: 2020-02-10

## 2020-02-10 DIAGNOSIS — R05.9 COUGH: Primary | ICD-10-CM

## 2020-02-10 RX ORDER — AZITHROMYCIN 250 MG/1
TABLET, FILM COATED ORAL
Qty: 6 TABLET | Refills: 0 | Status: SHIPPED | OUTPATIENT
Start: 2020-02-10 | End: 2020-06-29

## 2020-02-10 NOTE — TELEPHONE ENCOUNTER
I want her to continue to monitor.  If it stays that high will need to adjust medicine.  Goal is 120-130-/under 80.

## 2020-02-10 NOTE — TELEPHONE ENCOUNTER
Called and talked to pt. She said she has not gotten any better. She borrowed her daughter's nebulizer and did a breathing treatment, with a little relief, but has been coughing all morning

## 2020-02-10 NOTE — TELEPHONE ENCOUNTER
Pt called from a doc appt for her mother and they checked Xochilt's B/P - it was 184/102. She said she has been dealing with some business issues which may have contributed to the elevation. They checked it again at 178/108.  Per Dr. Carrero have pt go home and sit in a quiet environment for a while and then recheck. Advised her and asked her to call between 4 and 4:30 with update. Also advised re: antibiotic and cough med

## 2020-02-10 NOTE — TELEPHONE ENCOUNTER
Sent in zpack and tussionex.  Suggest she check blood pressure when she gets home, was not high last week.

## 2020-02-10 NOTE — TELEPHONE ENCOUNTER
Pt was in last week for her cough and given a shot, but still is coughing very harshly; she wants to know if something else can be done to help her with the cough?     Miller Zaragoza

## 2020-02-17 RX ORDER — CHLORTHALIDONE 25 MG/1
25 TABLET ORAL DAILY
Qty: 30 TABLET | Refills: 5 | Status: SHIPPED | OUTPATIENT
Start: 2020-02-17 | End: 2020-06-29 | Stop reason: SDUPTHER

## 2020-02-24 RX ORDER — FLUOXETINE HYDROCHLORIDE 20 MG/1
CAPSULE ORAL
Qty: 30 CAPSULE | Refills: 5 | Status: SHIPPED | OUTPATIENT
Start: 2020-02-24 | End: 2020-06-29 | Stop reason: SDUPTHER

## 2020-02-24 RX ORDER — LISINOPRIL 10 MG/1
TABLET ORAL
Qty: 30 TABLET | Refills: 10 | OUTPATIENT
Start: 2020-02-24

## 2020-02-24 RX ORDER — HYDROCHLOROTHIAZIDE 25 MG/1
TABLET ORAL
Qty: 30 TABLET | Refills: 10 | OUTPATIENT
Start: 2020-02-24

## 2020-03-02 ENCOUNTER — HOSPITAL ENCOUNTER (OUTPATIENT)
Dept: MAMMOGRAPHY | Facility: HOSPITAL | Age: 65
Discharge: HOME | End: 2020-03-02
Admitting: OBSTETRICS & GYNECOLOGY

## 2020-03-02 DIAGNOSIS — Z12.31 VISIT FOR SCREENING MAMMOGRAM: ICD-10-CM

## 2020-03-02 PROCEDURE — 77067 SCR MAMMO BI INCL CAD: CPT

## 2020-03-02 PROCEDURE — 77063 BREAST TOMOSYNTHESIS BI: CPT | Performed by: RADIOLOGY

## 2020-03-02 PROCEDURE — 77063 BREAST TOMOSYNTHESIS BI: CPT

## 2020-03-02 PROCEDURE — 77067 SCR MAMMO BI INCL CAD: CPT | Performed by: RADIOLOGY

## 2020-03-04 ENCOUNTER — HOSPITAL ENCOUNTER (OUTPATIENT)
Dept: MAMMOGRAPHY | Facility: HOSPITAL | Age: 65
Discharge: HOME | End: 2020-03-04
Admitting: RADIOLOGY

## 2020-03-04 DIAGNOSIS — R92.8 ABNORMAL MAMMOGRAM: ICD-10-CM

## 2020-03-04 PROCEDURE — G0279 TOMOSYNTHESIS, MAMMO: HCPCS

## 2020-03-04 PROCEDURE — 77065 DX MAMMO INCL CAD UNI: CPT

## 2020-03-04 PROCEDURE — 77061 BREAST TOMOSYNTHESIS UNI: CPT | Performed by: RADIOLOGY

## 2020-03-04 PROCEDURE — 77065 DX MAMMO INCL CAD UNI: CPT | Performed by: RADIOLOGY

## 2020-03-10 ENCOUNTER — TELEPHONE (OUTPATIENT)
Dept: INTERNAL MEDICINE | Facility: CLINIC | Age: 65
End: 2020-03-10

## 2020-03-10 DIAGNOSIS — R73.9 HYPERGLYCEMIA: ICD-10-CM

## 2020-03-10 DIAGNOSIS — I10 BENIGN ESSENTIAL HYPERTENSION: Primary | ICD-10-CM

## 2020-03-10 DIAGNOSIS — E78.2 MIXED HYPERLIPIDEMIA: ICD-10-CM

## 2020-03-25 RX ORDER — ALBUTEROL SULFATE 90 UG/1
2 AEROSOL, METERED RESPIRATORY (INHALATION) EVERY 6 HOURS PRN
Qty: 1 INHALER | Refills: 5 | Status: SHIPPED | OUTPATIENT
Start: 2020-03-25

## 2020-06-25 ENCOUNTER — TRANSCRIBE ORDERS (OUTPATIENT)
Dept: ADMINISTRATIVE | Facility: HOSPITAL | Age: 65
End: 2020-06-25

## 2020-06-25 ENCOUNTER — TELEPHONE (OUTPATIENT)
Dept: INTERNAL MEDICINE | Facility: CLINIC | Age: 65
End: 2020-06-25

## 2020-06-25 DIAGNOSIS — M85.89 OTHER SPECIFIED DISORDERS OF BONE DENSITY AND STRUCTURE, MULTIPLE SITES: Primary | ICD-10-CM

## 2020-06-29 ENCOUNTER — OFFICE VISIT (OUTPATIENT)
Dept: INTERNAL MEDICINE | Facility: CLINIC | Age: 65
End: 2020-06-29

## 2020-06-29 ENCOUNTER — LAB (OUTPATIENT)
Dept: LAB | Facility: HOSPITAL | Age: 65
End: 2020-06-29

## 2020-06-29 VITALS
BODY MASS INDEX: 25.73 KG/M2 | HEIGHT: 62 IN | HEART RATE: 80 BPM | DIASTOLIC BLOOD PRESSURE: 90 MMHG | TEMPERATURE: 97.3 F | SYSTOLIC BLOOD PRESSURE: 142 MMHG | WEIGHT: 139.8 LBS

## 2020-06-29 DIAGNOSIS — F41.9 ANXIETY: ICD-10-CM

## 2020-06-29 DIAGNOSIS — F32.5 MAJOR DEPRESSIVE DISORDER WITH SINGLE EPISODE, IN FULL REMISSION (HCC): ICD-10-CM

## 2020-06-29 DIAGNOSIS — I10 BENIGN ESSENTIAL HYPERTENSION: ICD-10-CM

## 2020-06-29 DIAGNOSIS — R73.9 HYPERGLYCEMIA: ICD-10-CM

## 2020-06-29 DIAGNOSIS — E78.2 MIXED HYPERLIPIDEMIA: ICD-10-CM

## 2020-06-29 DIAGNOSIS — Z00.00 PREVENTATIVE HEALTH CARE: Primary | ICD-10-CM

## 2020-06-29 LAB
ALBUMIN SERPL-MCNC: 5 G/DL (ref 3.5–5.2)
ALBUMIN UR-MCNC: <1.2 MG/DL
ALBUMIN/GLOB SERPL: 1.9 G/DL
ALP SERPL-CCNC: 67 U/L (ref 39–117)
ALT SERPL W P-5'-P-CCNC: 33 U/L (ref 1–33)
ANION GAP SERPL CALCULATED.3IONS-SCNC: 9.2 MMOL/L (ref 5–15)
AST SERPL-CCNC: 30 U/L (ref 1–32)
BASOPHILS # BLD AUTO: 0.05 10*3/MM3 (ref 0–0.2)
BASOPHILS NFR BLD AUTO: 1.8 % (ref 0–1.5)
BILIRUB SERPL-MCNC: 1.2 MG/DL (ref 0.2–1.2)
BUN SERPL-MCNC: 10 MG/DL (ref 8–23)
BUN/CREAT SERPL: 15.4 (ref 7–25)
CALCIUM SPEC-SCNC: 9.4 MG/DL (ref 8.6–10.5)
CHLORIDE SERPL-SCNC: 89 MMOL/L (ref 98–107)
CHOLEST SERPL-MCNC: 241 MG/DL (ref 0–200)
CO2 SERPL-SCNC: 31.8 MMOL/L (ref 22–29)
CREAT SERPL-MCNC: 0.65 MG/DL (ref 0.57–1)
CREAT UR-MCNC: 114.8 MG/DL
DEPRECATED RDW RBC AUTO: 42.9 FL (ref 37–54)
EOSINOPHIL # BLD AUTO: 0.08 10*3/MM3 (ref 0–0.4)
EOSINOPHIL NFR BLD AUTO: 2.9 % (ref 0.3–6.2)
ERYTHROCYTE [DISTWIDTH] IN BLOOD BY AUTOMATED COUNT: 12.1 % (ref 12.3–15.4)
GFR SERPL CREATININE-BSD FRML MDRD: 91 ML/MIN/1.73
GLOBULIN UR ELPH-MCNC: 2.7 GM/DL
GLUCOSE SERPL-MCNC: 99 MG/DL (ref 65–99)
HBA1C MFR BLD: 5.1 % (ref 4.8–5.6)
HCT VFR BLD AUTO: 39.6 % (ref 34–46.6)
HDLC SERPL-MCNC: 80 MG/DL (ref 40–60)
HGB BLD-MCNC: 14 G/DL (ref 12–15.9)
IMM GRANULOCYTES # BLD AUTO: 0.01 10*3/MM3 (ref 0–0.05)
IMM GRANULOCYTES NFR BLD AUTO: 0.4 % (ref 0–0.5)
LDLC SERPL CALC-MCNC: 133 MG/DL (ref 0–100)
LDLC/HDLC SERPL: 1.66 {RATIO}
LYMPHOCYTES # BLD AUTO: 1.31 10*3/MM3 (ref 0.7–3.1)
LYMPHOCYTES NFR BLD AUTO: 47 % (ref 19.6–45.3)
MCH RBC QN AUTO: 34.3 PG (ref 26.6–33)
MCHC RBC AUTO-ENTMCNC: 35.4 G/DL (ref 31.5–35.7)
MCV RBC AUTO: 97.1 FL (ref 79–97)
MICROALBUMIN/CREAT UR: NORMAL MG/G{CREAT}
MONOCYTES # BLD AUTO: 0.36 10*3/MM3 (ref 0.1–0.9)
MONOCYTES NFR BLD AUTO: 12.9 % (ref 5–12)
NEUTROPHILS NFR BLD AUTO: 0.98 10*3/MM3 (ref 1.7–7)
NEUTROPHILS NFR BLD AUTO: 35 % (ref 42.7–76)
NRBC BLD AUTO-RTO: 0 /100 WBC (ref 0–0.2)
PLATELET # BLD AUTO: 342 10*3/MM3 (ref 140–450)
PMV BLD AUTO: 8.7 FL (ref 6–12)
POTASSIUM SERPL-SCNC: 3.4 MMOL/L (ref 3.5–5.2)
PROT SERPL-MCNC: 7.7 G/DL (ref 6–8.5)
RBC # BLD AUTO: 4.08 10*6/MM3 (ref 3.77–5.28)
SODIUM SERPL-SCNC: 130 MMOL/L (ref 136–145)
TRIGL SERPL-MCNC: 142 MG/DL (ref 0–150)
VLDLC SERPL-MCNC: 28.4 MG/DL
WBC # BLD AUTO: 2.79 10*3/MM3 (ref 3.4–10.8)

## 2020-06-29 PROCEDURE — 82043 UR ALBUMIN QUANTITATIVE: CPT

## 2020-06-29 PROCEDURE — 85025 COMPLETE CBC W/AUTO DIFF WBC: CPT

## 2020-06-29 PROCEDURE — 82570 ASSAY OF URINE CREATININE: CPT

## 2020-06-29 PROCEDURE — 80053 COMPREHEN METABOLIC PANEL: CPT

## 2020-06-29 PROCEDURE — 83036 HEMOGLOBIN GLYCOSYLATED A1C: CPT

## 2020-06-29 PROCEDURE — 80061 LIPID PANEL: CPT

## 2020-06-29 PROCEDURE — G0402 INITIAL PREVENTIVE EXAM: HCPCS | Performed by: INTERNAL MEDICINE

## 2020-06-29 RX ORDER — LISINOPRIL 30 MG/1
30 TABLET ORAL DAILY
Qty: 30 TABLET | Refills: 5 | Status: SHIPPED | OUTPATIENT
Start: 2020-06-29 | End: 2020-07-22 | Stop reason: SDUPTHER

## 2020-06-29 RX ORDER — DIPHENHYDRAMINE HCL 25 MG
25 TABLET ORAL NIGHTLY PRN
COMMUNITY
End: 2022-07-26

## 2020-06-29 RX ORDER — CHLORTHALIDONE 25 MG/1
25 TABLET ORAL DAILY
Qty: 30 TABLET | Refills: 5 | Status: SHIPPED | OUTPATIENT
Start: 2020-06-29 | End: 2020-07-21

## 2020-06-29 RX ORDER — FLUOXETINE HYDROCHLORIDE 20 MG/1
20 CAPSULE ORAL DAILY
Qty: 30 CAPSULE | Refills: 5 | Status: SHIPPED | OUTPATIENT
Start: 2020-06-29 | End: 2021-02-12 | Stop reason: SDUPTHER

## 2020-06-29 RX ORDER — ALPRAZOLAM 0.5 MG/1
TABLET ORAL
Qty: 60 TABLET | Refills: 2 | Status: SHIPPED | OUTPATIENT
Start: 2020-06-29 | End: 2021-01-15 | Stop reason: SDUPTHER

## 2020-07-02 DIAGNOSIS — I10 BENIGN ESSENTIAL HYPERTENSION: Primary | ICD-10-CM

## 2020-07-10 ENCOUNTER — LAB (OUTPATIENT)
Dept: LAB | Facility: HOSPITAL | Age: 65
End: 2020-07-10

## 2020-07-10 DIAGNOSIS — I10 BENIGN ESSENTIAL HYPERTENSION: ICD-10-CM

## 2020-07-10 LAB
ANION GAP SERPL CALCULATED.3IONS-SCNC: 12.2 MMOL/L (ref 5–15)
BUN SERPL-MCNC: 8 MG/DL (ref 8–23)
BUN/CREAT SERPL: 13.3 (ref 7–25)
CALCIUM SPEC-SCNC: 9.4 MG/DL (ref 8.6–10.5)
CHLORIDE SERPL-SCNC: 84 MMOL/L (ref 98–107)
CO2 SERPL-SCNC: 28.8 MMOL/L (ref 22–29)
CREAT SERPL-MCNC: 0.6 MG/DL (ref 0.57–1)
GFR SERPL CREATININE-BSD FRML MDRD: 100 ML/MIN/1.73
GLUCOSE SERPL-MCNC: 103 MG/DL (ref 65–99)
POTASSIUM SERPL-SCNC: 3.2 MMOL/L (ref 3.5–5.2)
SODIUM SERPL-SCNC: 125 MMOL/L (ref 136–145)

## 2020-07-10 PROCEDURE — 80048 BASIC METABOLIC PNL TOTAL CA: CPT

## 2020-07-12 DIAGNOSIS — E87.1 HYPONATREMIA: Primary | ICD-10-CM

## 2020-07-21 ENCOUNTER — TELEPHONE (OUTPATIENT)
Dept: INTERNAL MEDICINE | Facility: CLINIC | Age: 65
End: 2020-07-21

## 2020-07-21 DIAGNOSIS — I10 BENIGN ESSENTIAL HYPERTENSION: Primary | ICD-10-CM

## 2020-07-21 DIAGNOSIS — E87.6 HYPOKALEMIA: ICD-10-CM

## 2020-07-21 RX ORDER — TRIAMTERENE CAPSULES 50 MG/1
50 CAPSULE ORAL DAILY
Qty: 30 CAPSULE | Refills: 5 | Status: SHIPPED | OUTPATIENT
Start: 2020-07-21 | End: 2020-07-22

## 2020-07-21 NOTE — TELEPHONE ENCOUNTER
Patient called with concerns over BP.  Blood Pressure is showing to be 157/101.  Patient indicated she was taken off BP medicine due to her potassium being low.  Patient indicated that they do not feel well.  She has been taking her BP on a regular basis and this is the highest it has been ( 157/101).  Patient would like to discuss with clinical staff member  Please advise    Patient call back [phone]

## 2020-07-21 NOTE — TELEPHONE ENCOUNTER
Dr. Carrero took her off chlorthalidone after the last lab draw when her potassium was 3.2.  She is still taking lisinopril.      Stay off the chlorthalidone.  Replace it with triamterene 50 mg tablet daily.  It is a diuretic that will help with blood pressure but it actually helps the potassium level instead of decreasing it.

## 2020-07-22 ENCOUNTER — TELEPHONE (OUTPATIENT)
Dept: INTERNAL MEDICINE | Facility: CLINIC | Age: 65
End: 2020-07-22

## 2020-07-22 DIAGNOSIS — E87.6 HYPOKALEMIA: ICD-10-CM

## 2020-07-22 RX ORDER — LISINOPRIL 40 MG/1
40 TABLET ORAL DAILY
Qty: 30 TABLET | Refills: 5 | Status: SHIPPED | OUTPATIENT
Start: 2020-07-22 | End: 2021-01-18

## 2020-07-22 RX ORDER — TRIAMTERENE CAPSULES 50 MG/1
50 CAPSULE ORAL DAILY
Qty: 30 CAPSULE | Refills: 5 | Status: CANCELLED | OUTPATIENT
Start: 2020-07-22

## 2020-07-22 NOTE — TELEPHONE ENCOUNTER
Let thao know that since her insurance would not cover triamterene, let's just increase lisinopril to 40 mg.  I sent in new rx.  Start the higher dose, and send blood pressure readings next week.

## 2020-07-22 NOTE — TELEPHONE ENCOUNTER
VALENTINO WITH [employer] AT [facility] STATES THIS FOLLOWING IS COVERED UNDER THE PATIENT'S INSURANCE IT IS $115 triamterene (DYRENIUM) 50 MG capsule.    PHARMACIST IS ASKING IF THIS IS A NECESSARY MEDICATION OR IS THERE AN ALTERNATE THAT MIGHT BE MORE AFFORDABLE FOR THE PATIENT.    PLEASE CALL [phone]

## 2020-07-24 ENCOUNTER — TELEPHONE (OUTPATIENT)
Dept: INTERNAL MEDICINE | Facility: CLINIC | Age: 65
End: 2020-07-24

## 2020-09-04 DIAGNOSIS — I10 BENIGN ESSENTIAL HYPERTENSION: Primary | ICD-10-CM

## 2020-09-10 ENCOUNTER — LAB (OUTPATIENT)
Dept: LAB | Facility: HOSPITAL | Age: 65
End: 2020-09-10

## 2020-09-10 DIAGNOSIS — I10 BENIGN ESSENTIAL HYPERTENSION: ICD-10-CM

## 2020-09-10 LAB
ANION GAP SERPL CALCULATED.3IONS-SCNC: 11.7 MMOL/L (ref 5–15)
BUN SERPL-MCNC: 10 MG/DL (ref 8–23)
BUN/CREAT SERPL: 14.5 (ref 7–25)
CALCIUM SPEC-SCNC: 9.9 MG/DL (ref 8.6–10.5)
CHLORIDE SERPL-SCNC: 98 MMOL/L (ref 98–107)
CO2 SERPL-SCNC: 27.3 MMOL/L (ref 22–29)
CREAT SERPL-MCNC: 0.69 MG/DL (ref 0.57–1)
GFR SERPL CREATININE-BSD FRML MDRD: 85 ML/MIN/1.73
GLUCOSE SERPL-MCNC: 97 MG/DL (ref 65–99)
POTASSIUM SERPL-SCNC: 4.5 MMOL/L (ref 3.5–5.2)
SODIUM SERPL-SCNC: 137 MMOL/L (ref 136–145)

## 2020-09-10 PROCEDURE — 80048 BASIC METABOLIC PNL TOTAL CA: CPT

## 2020-10-12 ENCOUNTER — TELEPHONE (OUTPATIENT)
Dept: INTERNAL MEDICINE | Facility: CLINIC | Age: 65
End: 2020-10-12

## 2020-10-12 RX ORDER — AMLODIPINE BESYLATE 5 MG/1
5 TABLET ORAL DAILY
Qty: 30 TABLET | Refills: 1 | Status: SHIPPED | OUTPATIENT
Start: 2020-10-12 | End: 2020-12-08 | Stop reason: SDUPTHER

## 2020-10-12 NOTE — TELEPHONE ENCOUNTER
Noted suggest add amlodipine 5 mg daily for better control blood pressure please send in amlodipine 5 mg #30, Sigg of 1 daily please inform patient

## 2020-10-12 NOTE — TELEPHONE ENCOUNTER
PATIENT CALLED STATING THAT HER BLOOD PRESSURE IS NOT UNDER CONTROL. THIS MORNING IT /94, BEEN AVERAGING AROUND 140/90.    LIZET SUN    ANY QUESTIONS PLEASE CONTACT PATIENT -[phone]

## 2020-11-12 ENCOUNTER — HOSPITAL ENCOUNTER (OUTPATIENT)
Dept: BONE DENSITY | Facility: HOSPITAL | Age: 65
Discharge: HOME | End: 2020-11-12
Admitting: OBSTETRICS & GYNECOLOGY

## 2020-11-12 DIAGNOSIS — M85.89 OTHER SPECIFIED DISORDERS OF BONE DENSITY AND STRUCTURE, MULTIPLE SITES: ICD-10-CM

## 2020-11-12 PROCEDURE — 77080 DXA BONE DENSITY AXIAL: CPT

## 2020-12-08 RX ORDER — AMLODIPINE BESYLATE 5 MG/1
5 TABLET ORAL DAILY
Qty: 30 TABLET | Refills: 5 | Status: SHIPPED | OUTPATIENT
Start: 2020-12-08 | End: 2021-06-18

## 2021-01-15 DIAGNOSIS — F41.9 ANXIETY: ICD-10-CM

## 2021-01-15 RX ORDER — ALPRAZOLAM 0.5 MG/1
TABLET ORAL
Qty: 60 TABLET | Refills: 2 | Status: SHIPPED | OUTPATIENT
Start: 2021-01-15 | End: 2022-06-01 | Stop reason: SDUPTHER

## 2021-01-18 RX ORDER — LISINOPRIL 40 MG/1
TABLET ORAL
Qty: 90 TABLET | Refills: 4 | Status: SHIPPED | OUTPATIENT
Start: 2021-01-18 | End: 2022-03-25

## 2021-02-08 ENCOUNTER — TRANSCRIBE ORDERS (OUTPATIENT)
Dept: ADMINISTRATIVE | Facility: HOSPITAL | Age: 66
End: 2021-02-08

## 2021-02-08 DIAGNOSIS — Z12.31 VISIT FOR SCREENING MAMMOGRAM: Primary | ICD-10-CM

## 2021-02-12 RX ORDER — FLUOXETINE HYDROCHLORIDE 20 MG/1
20 CAPSULE ORAL DAILY
Qty: 30 CAPSULE | Refills: 5 | Status: SHIPPED | OUTPATIENT
Start: 2021-02-12 | End: 2021-08-13

## 2021-02-26 ENCOUNTER — IMMUNIZATION (OUTPATIENT)
Dept: VACCINE CLINIC | Facility: HOSPITAL | Age: 66
End: 2021-02-26

## 2021-02-26 PROCEDURE — 91301 HC SARSCO02 VAC 100MCG/0.5ML IM: CPT | Performed by: INTERNAL MEDICINE

## 2021-02-26 PROCEDURE — 0011A: CPT | Performed by: INTERNAL MEDICINE

## 2021-03-10 ENCOUNTER — HOSPITAL ENCOUNTER (OUTPATIENT)
Dept: CT IMAGING | Facility: HOSPITAL | Age: 66
Discharge: HOME | End: 2021-03-10

## 2021-03-10 ENCOUNTER — LAB (OUTPATIENT)
Dept: LAB | Facility: HOSPITAL | Age: 66
End: 2021-03-10

## 2021-03-10 ENCOUNTER — OFFICE VISIT (OUTPATIENT)
Dept: INTERNAL MEDICINE | Facility: CLINIC | Age: 66
End: 2021-03-10

## 2021-03-10 VITALS
HEIGHT: 60 IN | TEMPERATURE: 97.8 F | HEART RATE: 68 BPM | WEIGHT: 145.4 LBS | DIASTOLIC BLOOD PRESSURE: 70 MMHG | BODY MASS INDEX: 28.54 KG/M2 | SYSTOLIC BLOOD PRESSURE: 122 MMHG

## 2021-03-10 DIAGNOSIS — K62.5 RECTAL BLEEDING: ICD-10-CM

## 2021-03-10 DIAGNOSIS — R10.31 RIGHT LOWER QUADRANT ABDOMINAL PAIN: Primary | ICD-10-CM

## 2021-03-10 DIAGNOSIS — R10.31 RIGHT LOWER QUADRANT ABDOMINAL PAIN: ICD-10-CM

## 2021-03-10 LAB
BASOPHILS # BLD AUTO: 0.05 10*3/MM3 (ref 0–0.2)
BASOPHILS NFR BLD AUTO: 0.6 % (ref 0–1.5)
BILIRUB UR QL STRIP: NEGATIVE
CLARITY UR: CLEAR
COLOR UR: YELLOW
DEPRECATED RDW RBC AUTO: 44.7 FL (ref 37–54)
EOSINOPHIL # BLD AUTO: 0.15 10*3/MM3 (ref 0–0.4)
EOSINOPHIL NFR BLD AUTO: 1.7 % (ref 0.3–6.2)
ERYTHROCYTE [DISTWIDTH] IN BLOOD BY AUTOMATED COUNT: 12.5 % (ref 12.3–15.4)
GLUCOSE UR STRIP-MCNC: NEGATIVE MG/DL
HCT VFR BLD AUTO: 40.6 % (ref 34–46.6)
HGB BLD-MCNC: 13.8 G/DL (ref 12–15.9)
HGB UR QL STRIP.AUTO: NEGATIVE
IMM GRANULOCYTES # BLD AUTO: 0.03 10*3/MM3 (ref 0–0.05)
IMM GRANULOCYTES NFR BLD AUTO: 0.3 % (ref 0–0.5)
KETONES UR QL STRIP: ABNORMAL
LEUKOCYTE ESTERASE UR QL STRIP.AUTO: NEGATIVE
LYMPHOCYTES # BLD AUTO: 1.76 10*3/MM3 (ref 0.7–3.1)
LYMPHOCYTES NFR BLD AUTO: 19.9 % (ref 19.6–45.3)
MCH RBC QN AUTO: 33.2 PG (ref 26.6–33)
MCHC RBC AUTO-ENTMCNC: 34 G/DL (ref 31.5–35.7)
MCV RBC AUTO: 97.6 FL (ref 79–97)
MONOCYTES # BLD AUTO: 0.84 10*3/MM3 (ref 0.1–0.9)
MONOCYTES NFR BLD AUTO: 9.5 % (ref 5–12)
NEUTROPHILS NFR BLD AUTO: 6.02 10*3/MM3 (ref 1.7–7)
NEUTROPHILS NFR BLD AUTO: 68 % (ref 42.7–76)
NITRITE UR QL STRIP: NEGATIVE
NRBC BLD AUTO-RTO: 0 /100 WBC (ref 0–0.2)
PH UR STRIP.AUTO: 6.5 [PH] (ref 5–8)
PLATELET # BLD AUTO: 277 10*3/MM3 (ref 140–450)
PMV BLD AUTO: 9.9 FL (ref 6–12)
PROT UR QL STRIP: NEGATIVE
RBC # BLD AUTO: 4.16 10*6/MM3 (ref 3.77–5.28)
SP GR UR STRIP: 1.01 (ref 1–1.03)
UROBILINOGEN UR QL STRIP: ABNORMAL
WBC # BLD AUTO: 8.85 10*3/MM3 (ref 3.4–10.8)

## 2021-03-10 PROCEDURE — 80053 COMPREHEN METABOLIC PANEL: CPT

## 2021-03-10 PROCEDURE — 99214 OFFICE O/P EST MOD 30 MIN: CPT | Performed by: INTERNAL MEDICINE

## 2021-03-10 PROCEDURE — 74176 CT ABD & PELVIS W/O CONTRAST: CPT

## 2021-03-10 PROCEDURE — 83690 ASSAY OF LIPASE: CPT

## 2021-03-10 PROCEDURE — 85025 COMPLETE CBC W/AUTO DIFF WBC: CPT

## 2021-03-10 PROCEDURE — 81003 URINALYSIS AUTO W/O SCOPE: CPT

## 2021-03-10 NOTE — PROGRESS NOTES
[facility]     Xochilt Vasquez  1955   [id number]      Patient Care Team:  Dilip Carrero MD as PCP - General  Dilip Carrero MD as PCP - Family Medicine    Chief Complaint::   Chief Complaint   Patient presents with   • Abdominal Pain        HPI  Ms. Vasquez noticed abdominal pain yesterday morning when she arose.  It was lower.  There is no dysuria, change in bowel habits or decreased appetite.  It seemed to get better as the day progressed but this morning she again experienced lower abdominal pain and noticed bloody mucus when she had a bowel movement.  She has had no fever or chills, nausea or vomiting.    Chronic Conditions:      Patient Active Problem List   Diagnosis   • Allergic reaction   • AR (allergic rhinitis)   • Asthma   • Benign essential hypertension   • Depressive disorder   • Loss of hair   • Mixed hyperlipidemia   • Primary insomnia   • Major depressive disorder with single episode, in full remission (CMS/[facility])        No past medical history on file.    Past Surgical History:   Procedure Laterality Date   • AUGMENTATION MAMMAPLASTY Bilateral    • BREAST EXCISIONAL BIOPSY Right        Family History   Problem Relation Age of Onset   • Ovarian cancer Neg Hx    • Breast cancer Neg Hx        Social History     Socioeconomic History   • Marital status:      Spouse name: Not on file   • Number of children: Not on file   • Years of education: Not on file   • Highest education level: Not on file   Tobacco Use   • Smoking status: Former Smoker     Packs/day: 0.25     Years: 20.00     Pack years: 5.00     Types: Cigarettes     Quit date: 2012     Years since quittin.0   • Smokeless tobacco: Never Used   • Tobacco comment: 0.1 PPD for 20 years Quit 2012   Substance and Sexual Activity   • Alcohol use: Yes     Comment: moderate   • Drug use: Defer   • Sexual activity: Never       No Known Allergies      Current Outpatient Medications:   •   albuterol sulfate HFA (ProAir HFA) 108 (90 Base) MCG/ACT inhaler, Inhale 2 puffs Every 6 (Six) Hours As Needed for Wheezing., Disp: 1 inhaler, Rfl: 5  •  ALPRAZolam (Xanax) 0.5 MG tablet, Take 1 tablet twice a day as needed for anxiety, Disp: 60 tablet, Rfl: 2  •  amLODIPine (NORVASC) 5 MG tablet, Take 1 tablet by mouth Daily., Disp: 30 tablet, Rfl: 5  •  Calcium Carb-Cholecalciferol (CALCIUM 1000 + D) 1000-800 MG-UNIT tablet, Take  by mouth., Disp: , Rfl:   •  diphenhydrAMINE (BENADRYL) 25 MG tablet, Take 25 mg by mouth At Night As Needed for Sleep., Disp: , Rfl:   •  fexofenadine (ALLEGRA) 180 MG tablet, Take 1 tablet by mouth Daily., Disp: , Rfl:   •  FLUoxetine (PROzac) 20 MG capsule, Take 1 capsule by mouth Daily., Disp: 30 capsule, Rfl: 5  •  fluticasone-salmeterol (Advair Diskus) 100-50 MCG/DOSE DISKUS, Inhale 1 puff 2 (Two) Times a Day. (Patient taking differently: Inhale 1 puff 2 (Two) Times a Day As Needed.), Disp: 1 each, Rfl: 11  •  lisinopril (PRINIVIL,ZESTRIL) 40 MG tablet, TAKE ONE TABLET BY MOUTH DAILY, Disp: 90 tablet, Rfl: 4  •  metoprolol tartrate (LOPRESSOR) 25 MG tablet, TAKE ONE TABLET BY MOUTH TWICE A DAY, Disp: 60 tablet, Rfl: 5  •  Multiple Vitamins-Minerals (MULTI FOR HER 50+ PO), Take  by mouth., Disp: , Rfl:   •  PREMARIN 0.625 MG/GM vaginal cream, , Disp: , Rfl:     Review of Systems   Constitutional: Negative for chills, fatigue and fever.   HENT: Negative for congestion, ear pain and sinus pressure.    Respiratory: Negative for cough, chest tightness, shortness of breath and wheezing.    Cardiovascular: Negative for chest pain and palpitations.   Gastrointestinal: Positive for abdominal pain and blood in stool. Negative for constipation.   Skin: Negative for color change.   Allergic/Immunologic: Negative for environmental allergies.   Neurological: Negative for dizziness, speech difficulty and headache.   Psychiatric/Behavioral: Negative for decreased concentration. The patient is not  "nervous/anxious.         Vital Signs  Vitals:    03/10/21 1140   BP: 122/70   BP Location: Right arm   Patient Position: Sitting   Cuff Size: Adult   Pulse: 68   Temp: 97.8 °F (36.6 °C)   Weight: 66 kg (145 lb 6.4 oz)   Height: 152.4 cm (60\")   PainSc:   8   PainLoc: Abdomen       Physical Exam  Vitals reviewed.   Constitutional:       Appearance: She is well-developed.   HENT:      Head: Normocephalic and atraumatic.   Cardiovascular:      Rate and Rhythm: Normal rate and regular rhythm.      Heart sounds: Normal heart sounds. No murmur.   Pulmonary:      Effort: Pulmonary effort is normal.      Breath sounds: Normal breath sounds.   Abdominal:      General: Bowel sounds are normal.      Palpations: Abdomen is soft.      Tenderness: There is abdominal tenderness in the right lower quadrant. There is no guarding or rebound.      Comments: Initially she seemed to have more tenderness in the right lower quadrant but on reexamination she has mild tenderness diffusely across the lower abdomen.   Neurological:      Mental Status: She is alert and oriented to person, place, and time.          Procedures    ACE III MINI             Assessment/Plan:    Diagnoses and all orders for this visit:    1. Right lower quadrant abdominal pain (Primary)  -     CT Abdomen Pelvis Without Contrast; Future  -     CBC & Differential; Future  -     Comprehensive Metabolic Panel; Future  -     Lipase; Future  -     Urinalysis With Culture If Indicated - Urine, Clean Catch; Future    2. Rectal bleeding  -     CT Abdomen Pelvis Without Contrast; Future    Plan    Differential includes appendicitis and diverticulitis.  CT scan and labs are ordered.      Plan of care reviewed with patient at the conclusion of today's visit. Education was provided regarding diagnosis, management, and any prescribed or recommended OTC medications.Patient verbalizes understanding of and agreement with management plan.         Dilip Carrero MD           "

## 2021-03-11 ENCOUNTER — APPOINTMENT (OUTPATIENT)
Dept: MAMMOGRAPHY | Facility: HOSPITAL | Age: 66
End: 2021-03-11

## 2021-03-11 LAB
ALBUMIN SERPL-MCNC: 4.9 G/DL (ref 3.5–5.2)
ALBUMIN/GLOB SERPL: 1.4 G/DL
ALP SERPL-CCNC: 95 U/L (ref 39–117)
ALT SERPL W P-5'-P-CCNC: 25 U/L (ref 1–33)
ANION GAP SERPL CALCULATED.3IONS-SCNC: 15.2 MMOL/L (ref 5–15)
AST SERPL-CCNC: 29 U/L (ref 1–32)
BILIRUB SERPL-MCNC: 1.5 MG/DL (ref 0–1.2)
BUN SERPL-MCNC: 11 MG/DL (ref 8–23)
BUN/CREAT SERPL: 17.7 (ref 7–25)
CALCIUM SPEC-SCNC: 9.8 MG/DL (ref 8.6–10.5)
CHLORIDE SERPL-SCNC: 97 MMOL/L (ref 98–107)
CO2 SERPL-SCNC: 23.8 MMOL/L (ref 22–29)
CREAT SERPL-MCNC: 0.62 MG/DL (ref 0.57–1)
GFR SERPL CREATININE-BSD FRML MDRD: 97 ML/MIN/1.73
GLOBULIN UR ELPH-MCNC: 3.5 GM/DL
GLUCOSE SERPL-MCNC: 89 MG/DL (ref 65–99)
LIPASE SERPL-CCNC: 34 U/L (ref 13–60)
POTASSIUM SERPL-SCNC: 4.8 MMOL/L (ref 3.5–5.2)
PROT SERPL-MCNC: 8.4 G/DL (ref 6–8.5)
SODIUM SERPL-SCNC: 136 MMOL/L (ref 136–145)

## 2021-03-11 RX ORDER — AMOXICILLIN AND CLAVULANATE POTASSIUM 875; 125 MG/1; MG/1
1 TABLET, FILM COATED ORAL 2 TIMES DAILY
Qty: 20 TABLET | Refills: 0 | Status: SHIPPED | OUTPATIENT
Start: 2021-03-11 | End: 2021-05-07

## 2021-03-26 ENCOUNTER — IMMUNIZATION (OUTPATIENT)
Dept: VACCINE CLINIC | Facility: HOSPITAL | Age: 66
End: 2021-03-26

## 2021-03-26 PROCEDURE — 91301 HC SARSCO02 VAC 100MCG/0.5ML IM: CPT | Performed by: INTERNAL MEDICINE

## 2021-03-26 PROCEDURE — 0012A: CPT | Performed by: INTERNAL MEDICINE

## 2021-05-07 ENCOUNTER — OFFICE VISIT (OUTPATIENT)
Dept: INTERNAL MEDICINE | Facility: CLINIC | Age: 66
End: 2021-05-07

## 2021-05-07 VITALS
DIASTOLIC BLOOD PRESSURE: 70 MMHG | BODY MASS INDEX: 28.66 KG/M2 | TEMPERATURE: 97.5 F | HEART RATE: 76 BPM | HEIGHT: 60 IN | SYSTOLIC BLOOD PRESSURE: 132 MMHG | WEIGHT: 146 LBS

## 2021-05-07 DIAGNOSIS — K62.5 RECTAL BLEEDING: Primary | ICD-10-CM

## 2021-05-07 PROCEDURE — 99213 OFFICE O/P EST LOW 20 MIN: CPT | Performed by: INTERNAL MEDICINE

## 2021-05-07 RX ORDER — HYDROCORTISONE 25 MG/G
CREAM TOPICAL 2 TIMES DAILY
Qty: 28 G | Refills: 0 | Status: SHIPPED | OUTPATIENT
Start: 2021-05-07

## 2021-05-07 NOTE — PROGRESS NOTES
[facility]     Xochilt Vasquez  1955   [id number]      Patient Care Team:  Dilip Carrero MD as PCP - General  Dilip Carrero MD as PCP - Family Medicine    Chief Complaint::   Chief Complaint   Patient presents with   • Rectal Bleeding        HPI  Ms. Vasquez comes in today concerned about an episode of rectal bleeding.  She was in the grocery stores when she felt she needed to go to the bathroom.  She did not have a bowel movement but when she wiped there was bright red blood on the toilet paper and then a few drops of blood in the toilet.  2 months ago she was treated for acute diverticulitis.  That has long since resolved.  CT scan done at the onset did show acute diverticulosis of the sigmoid.  She otherwise feels well.  She has no lightheadedness today.  She had an episode of vertigo earlier this week but has not had fatigue, melena, change in bowel habits, nausea or vomiting.    Chronic Conditions:      Patient Active Problem List   Diagnosis   • Allergic reaction   • AR (allergic rhinitis)   • Asthma   • Benign essential hypertension   • Depressive disorder   • Loss of hair   • Mixed hyperlipidemia   • Primary insomnia   • Major depressive disorder with single episode, in full remission (CMS/[facility])        No past medical history on file.    Past Surgical History:   Procedure Laterality Date   • AUGMENTATION MAMMAPLASTY Bilateral    • BREAST EXCISIONAL BIOPSY Right        Family History   Problem Relation Age of Onset   • Ovarian cancer Neg Hx    • Breast cancer Neg Hx        Social History     Socioeconomic History   • Marital status:      Spouse name: Not on file   • Number of children: Not on file   • Years of education: Not on file   • Highest education level: Not on file   Tobacco Use   • Smoking status: Former Smoker     Packs/day: 0.25     Years: 20.00     Pack years: 5.00     Types: Cigarettes     Quit date: 2012     Years since quittin.2   •  Smokeless tobacco: Never Used   • Tobacco comment: 0.1 PPD for 20 years Quit 01/01/2012   Substance and Sexual Activity   • Alcohol use: Yes     Comment: moderate   • Drug use: Defer   • Sexual activity: Never       No Known Allergies      Current Outpatient Medications:   •  albuterol sulfate HFA (ProAir HFA) 108 (90 Base) MCG/ACT inhaler, Inhale 2 puffs Every 6 (Six) Hours As Needed for Wheezing., Disp: 1 inhaler, Rfl: 5  •  ALPRAZolam (Xanax) 0.5 MG tablet, Take 1 tablet twice a day as needed for anxiety, Disp: 60 tablet, Rfl: 2  •  amLODIPine (NORVASC) 5 MG tablet, Take 1 tablet by mouth Daily., Disp: 30 tablet, Rfl: 5  •  Calcium Carb-Cholecalciferol (CALCIUM 1000 + D) 1000-800 MG-UNIT tablet, Take  by mouth., Disp: , Rfl:   •  diphenhydrAMINE (BENADRYL) 25 MG tablet, Take 25 mg by mouth At Night As Needed for Sleep., Disp: , Rfl:   •  fexofenadine (ALLEGRA) 180 MG tablet, Take 1 tablet by mouth Daily., Disp: , Rfl:   •  FLUoxetine (PROzac) 20 MG capsule, Take 1 capsule by mouth Daily., Disp: 30 capsule, Rfl: 5  •  fluticasone-salmeterol (ADVAIR) 100-50 MCG/DOSE DISKUS, INHALE ONE PUFF BY MOUTH TWICE A DAY, Disp: 60 each, Rfl: 10  •  lisinopril (PRINIVIL,ZESTRIL) 40 MG tablet, TAKE ONE TABLET BY MOUTH DAILY, Disp: 90 tablet, Rfl: 4  •  metoprolol tartrate (LOPRESSOR) 25 MG tablet, TAKE ONE TABLET BY MOUTH TWICE A DAY, Disp: 60 tablet, Rfl: 5  •  Multiple Vitamins-Minerals (MULTI FOR HER 50+ PO), Take  by mouth., Disp: , Rfl:   •  PREMARIN 0.625 MG/GM vaginal cream, , Disp: , Rfl:   •  Hydrocortisone, Perianal, (ANUSOL-HC) 2.5 % rectal cream, Insert  into the rectum 2 (Two) Times a Day., Disp: 28 g, Rfl: 0    Review of Systems   Constitutional: Negative.    Respiratory: Negative.  Negative for chest tightness and shortness of breath.    Cardiovascular: Negative.  Negative for chest pain.   Gastrointestinal: Negative for abdominal pain, blood in stool, constipation, diarrhea and rectal pain.        Vital  "Signs  Vitals:    05/07/21 1440   BP: 132/70   BP Location: Left arm   Patient Position: Sitting   Cuff Size: Adult   Pulse: 76   Temp: 97.5 °F (36.4 °C)   Weight: 66.2 kg (146 lb)   Height: 152.4 cm (60\")   PainSc:   3   PainLoc: Abdomen       Physical Exam  Vitals reviewed.   Constitutional:       Appearance: She is well-developed.   HENT:      Head: Normocephalic and atraumatic.   Cardiovascular:      Rate and Rhythm: Normal rate and regular rhythm.      Heart sounds: Normal heart sounds. No murmur heard.     Pulmonary:      Effort: Pulmonary effort is normal.      Breath sounds: Normal breath sounds.   Abdominal:      General: Bowel sounds are normal.      Palpations: Abdomen is soft.      Tenderness: There is no abdominal tenderness.   Neurological:      Mental Status: She is alert and oriented to person, place, and time.          Procedures    ACE III MINI             Assessment/Plan:    Diagnoses and all orders for this visit:    1. Rectal bleeding (Primary)    Other orders  -     Hydrocortisone, Perianal, (ANUSOL-HC) 2.5 % rectal cream; Insert  into the rectum 2 (Two) Times a Day.  Dispense: 28 g; Refill: 0    Plan    Character of her current episode of bleeding is almost certainly hemorrhoidal.  She had a colonoscopy in 2019 which did show diverticular disease.  Today she has no signs or symptoms of her previous diverticulitis.  She is given topical steroids to use for her hemorrhoids.  Only if she has recurrent bleeding would not refer back for colonoscopy sooner than screening is indicated.      Plan of care reviewed with patient at the conclusion of today's visit. Education was provided regarding diagnosis, management, and any prescribed or recommended OTC medications.Patient verbalizes understanding of and agreement with management plan.         Dilip Carrero MD           "

## 2021-05-10 ENCOUNTER — HOSPITAL ENCOUNTER (OUTPATIENT)
Dept: MAMMOGRAPHY | Facility: HOSPITAL | Age: 66
Discharge: HOME | End: 2021-05-10
Admitting: OBSTETRICS & GYNECOLOGY

## 2021-05-10 DIAGNOSIS — Z12.31 VISIT FOR SCREENING MAMMOGRAM: ICD-10-CM

## 2021-05-10 PROCEDURE — 77063 BREAST TOMOSYNTHESIS BI: CPT

## 2021-05-10 PROCEDURE — 77063 BREAST TOMOSYNTHESIS BI: CPT | Performed by: RADIOLOGY

## 2021-05-10 PROCEDURE — 77067 SCR MAMMO BI INCL CAD: CPT

## 2021-05-10 PROCEDURE — 77067 SCR MAMMO BI INCL CAD: CPT | Performed by: RADIOLOGY

## 2021-06-18 RX ORDER — AMLODIPINE BESYLATE 5 MG/1
TABLET ORAL
Qty: 60 TABLET | Refills: 5 | Status: SHIPPED | OUTPATIENT
Start: 2021-06-18 | End: 2022-06-21

## 2021-06-30 ENCOUNTER — OFFICE VISIT (OUTPATIENT)
Dept: INTERNAL MEDICINE | Facility: CLINIC | Age: 66
End: 2021-06-30

## 2021-06-30 VITALS
SYSTOLIC BLOOD PRESSURE: 130 MMHG | DIASTOLIC BLOOD PRESSURE: 76 MMHG | TEMPERATURE: 98.4 F | WEIGHT: 145 LBS | HEIGHT: 62 IN | HEART RATE: 64 BPM | BODY MASS INDEX: 26.68 KG/M2

## 2021-06-30 DIAGNOSIS — E78.2 MIXED HYPERLIPIDEMIA: ICD-10-CM

## 2021-06-30 DIAGNOSIS — Z11.59 ENCOUNTER FOR HEPATITIS C SCREENING TEST FOR LOW RISK PATIENT: ICD-10-CM

## 2021-06-30 DIAGNOSIS — F32.5 MAJOR DEPRESSIVE DISORDER WITH SINGLE EPISODE, IN FULL REMISSION (HCC): ICD-10-CM

## 2021-06-30 DIAGNOSIS — I10 BENIGN ESSENTIAL HYPERTENSION: ICD-10-CM

## 2021-06-30 DIAGNOSIS — Z00.00 PREVENTATIVE HEALTH CARE: Primary | ICD-10-CM

## 2021-06-30 DIAGNOSIS — R73.09 ABNORMAL GLUCOSE: ICD-10-CM

## 2021-06-30 PROCEDURE — G0438 PPPS, INITIAL VISIT: HCPCS | Performed by: INTERNAL MEDICINE

## 2021-06-30 NOTE — PROGRESS NOTES
QUICK REFERENCE INFORMATION:  The ABCs of the Annual Wellness Visit    Initial Medicare Wellness Visit    HEALTH RISK ASSESSMENT    1955    Recent Hospitalizations:  No hospitalization(s) within the last year..        Current Medical Providers:  Patient Care Team:  Dilip Carrero MD as PCP - General  Dilip Carrero MD as PCP - Family Medicine        Smoking Status:  Social History     Tobacco Use   Smoking Status Former Smoker   • Packs/day: 0.25   • Years: 20.00   • Pack years: 5.00   • Types: Cigarettes   • Quit date: 2012   • Years since quittin.3   Smokeless Tobacco Never Used   Tobacco Comment    0.1 PPD for 20 years Quit 2012       Alcohol Consumption:  Social History     Substance and Sexual Activity   Alcohol Use Yes    Comment: moderate       Depression Screen:   PHQ-2/PHQ-9 Depression Screening 2021   Little interest or pleasure in doing things 1   Feeling down, depressed, or hopeless 1   Trouble falling or staying asleep, or sleeping too much 1   Feeling tired or having little energy 1   Poor appetite or overeating 0   Feeling bad about yourself - or that you are a failure or have let yourself or your family down 0   Trouble concentrating on things, such as reading the newspaper or watching television 0   Moving or speaking so slowly that other people could have noticed. Or the opposite - being so fidgety or restless that you have been moving around a lot more than usual 0   Thoughts that you would be better off dead, or of hurting yourself in some way 0   Total Score 4   If you checked off any problems, how difficult have these problems made it for you to do your work, take care of things at home, or get along with other people? Not difficult at all       Health Habits and Functional and Cognitive Screening:  Functional & Cognitive Status 2021   Do you have difficulty preparing food and eating? No   Do you have difficulty bathing yourself, getting dressed or  grooming yourself? No   Do you have difficulty using the toilet? No   Do you have difficulty moving around from place to place? No   Do you have trouble with steps or getting out of a bed or a chair? No   Current Diet Well Balanced Diet   Dental Exam Up to date   Eye Exam Up to date   Exercise (times per week) 3 times per week        Exercise Frequency Comment 2-3   Current Exercises Include Walking   Current Exercise Activities Include -   Do you need help using the phone?  No   Are you deaf or do you have serious difficulty hearing?  No   Do you need help with transportation? No   Do you need help shopping? No   Do you need help preparing meals?  No   Do you need help with housework?  No   Do you need help with laundry? No   Do you need help taking your medications? No   Do you need help managing money? No   Do you ever drive or ride in a car without wearing a seat belt? No   Have you felt unusual stress, anger or loneliness in the last month? No   Who do you live with? Alone   If you need help, do you have trouble finding someone available to you? No   Have you been bothered in the last four weeks by sexual problems? No   Do you have difficulty concentrating, remembering or making decisions? No       Fall Risk Screen:  SHERRI Fall Risk Assessment was completed, and patient is at LOW risk for falls.Assessment completed on:6/30/2021    ACE III MINI        Does the patient have evidence of cognitive impairment? No    Asiprin use counseling: Does not need ASA (and currently is not on it)      Recent Lab Results:  CMP:  Lab Results   Component Value Date    BUN 11 03/10/2021    CREATININE 0.62 03/10/2021    EGFRIFNONA 97 03/10/2021    BCR 17.7 03/10/2021     03/10/2021    K 4.8 03/10/2021    CO2 23.8 03/10/2021    CALCIUM 9.8 03/10/2021    ALBUMIN 4.90 03/10/2021    BILITOT 1.5 (H) 03/10/2021    ALKPHOS 95 03/10/2021    AST 29 03/10/2021    ALT 25 03/10/2021     HbA1c:  Lab Results   Component Value Date     HGBA1C 5.10 06/29/2020    HGBA1C 4.90 08/27/2019     Microalbumin:  Lab Results   Component Value Date    MICROALBUR <1.2 06/29/2020     Lipid Panel  Lab Results   Component Value Date    CHOL 241 (H) 06/29/2020    TRIG 142 06/29/2020    HDL 80 (H) 06/29/2020     (H) 06/29/2020    AST 29 03/10/2021    ALT 25 03/10/2021       Visual Acuity:  No exam data present    Age-appropriate Screening Schedule:  Refer to the list below for future screening recommendations based on patient's age, sex and/or medical conditions. Orders for these recommended tests are listed in the plan section. The patient has been provided with a written plan.    Health Maintenance   Topic Date Due   • TDAP/TD VACCINES (1 - Tdap) Never done   • ZOSTER VACCINE (1 of 2) Never done   • LIPID PANEL  06/29/2021   • INFLUENZA VACCINE  08/01/2021   • DXA SCAN  11/12/2022   • MAMMOGRAM  05/10/2023   • PAP SMEAR  06/30/2023        Subjective   History of Present Illness    Xochilt Vasquez is a 66 y.o. female who presents for an Annual Wellness Visit.    CHRONIC CONDITIONS    The following portions of the patient's history were reviewed and updated as appropriate: allergies, current medications, past family history, past medical history, past social history, past surgical history and problem list.    Outpatient Medications Prior to Visit   Medication Sig Dispense Refill   • albuterol sulfate HFA (ProAir HFA) 108 (90 Base) MCG/ACT inhaler Inhale 2 puffs Every 6 (Six) Hours As Needed for Wheezing. 1 inhaler 5   • ALPRAZolam (Xanax) 0.5 MG tablet Take 1 tablet twice a day as needed for anxiety 60 tablet 2   • amLODIPine (NORVASC) 5 MG tablet TAKE ONE TABLET BY MOUTH DAILY 60 tablet 5   • Calcium Carb-Cholecalciferol (CALCIUM 1000 + D) 1000-800 MG-UNIT tablet Take  by mouth.     • diphenhydrAMINE (BENADRYL) 25 MG tablet Take 25 mg by mouth At Night As Needed for Sleep.     • fexofenadine (ALLEGRA) 180 MG tablet Take 1 tablet by mouth Daily.     •  FLUoxetine (PROzac) 20 MG capsule Take 1 capsule by mouth Daily. 30 capsule 5   • fluticasone-salmeterol (ADVAIR) 100-50 MCG/DOSE DISKUS INHALE ONE PUFF BY MOUTH TWICE A DAY 60 each 10   • Hydrocortisone, Perianal, (ANUSOL-HC) 2.5 % rectal cream Insert  into the rectum 2 (Two) Times a Day. 28 g 0   • lisinopril (PRINIVIL,ZESTRIL) 40 MG tablet TAKE ONE TABLET BY MOUTH DAILY 90 tablet 4   • metoprolol tartrate (LOPRESSOR) 25 MG tablet TAKE ONE TABLET BY MOUTH TWICE A DAY 60 tablet 5   • Multiple Vitamins-Minerals (MULTI FOR HER 50+ PO) Take  by mouth.     • PREMARIN 0.625 MG/GM vaginal cream Insert 1 g into the vagina 2 (Two) Times a Week. Sometimes 3 x week       No facility-administered medications prior to visit.       Patient Active Problem List   Diagnosis   • Allergic reaction   • AR (allergic rhinitis)   • Asthma   • Benign essential hypertension   • Depressive disorder   • Loss of hair   • Mixed hyperlipidemia   • Primary insomnia   • Major depressive disorder with single episode, in full remission (CMS/[facility])       Advance Care Planning:  ACP discussion was held with the patient during this visit. Patient has an advance directive in EMR which is still valid.     Identification of Risk Factors:  Risk factors include: Advance Directive Discussion.    Review of Systems   Constitutional: Negative for chills, fatigue and fever.   HENT: Negative for congestion, ear pain and sinus pressure.    Respiratory: Negative for cough, chest tightness, shortness of breath and wheezing.    Cardiovascular: Negative for chest pain and palpitations.   Gastrointestinal: Negative for abdominal pain, blood in stool and constipation.   Skin: Negative for color change.   Allergic/Immunologic: Negative for environmental allergies.   Neurological: Negative for dizziness, speech difficulty and headaches.   Psychiatric/Behavioral: Negative for confusion. The patient is not nervous/anxious.        Compared to one year ago, the patient feels  her physical health is the same.  Compared to one year ago, the patient feels her mental health is the same.    Objective     Physical Exam  Vitals and nursing note reviewed.   Constitutional:       Appearance: She is well-developed.   HENT:      Head: Normocephalic and atraumatic.      Right Ear: External ear normal.      Left Ear: External ear normal.      Nose: Nose normal.      Mouth/Throat:      Pharynx: No oropharyngeal exudate.   Eyes:      Conjunctiva/sclera: Conjunctivae normal.      Pupils: Pupils are equal, round, and reactive to light.   Neck:      Thyroid: No thyromegaly.      Vascular: No JVD.   Cardiovascular:      Rate and Rhythm: Normal rate and regular rhythm.      Heart sounds: Normal heart sounds. No murmur heard.   No friction rub. No gallop.    Pulmonary:      Effort: Pulmonary effort is normal. No respiratory distress.      Breath sounds: Normal breath sounds. No wheezing or rales.   Abdominal:      General: Bowel sounds are normal. There is no distension.      Palpations: Abdomen is soft. There is no mass.      Tenderness: There is no abdominal tenderness. There is no guarding or rebound.      Hernia: No hernia is present.   Musculoskeletal:         General: No tenderness. Normal range of motion.      Cervical back: Normal range of motion and neck supple.   Lymphadenopathy:      Cervical: No cervical adenopathy.   Skin:     General: Skin is warm and dry.      Findings: No erythema or rash.   Neurological:      Mental Status: She is alert and oriented to person, place, and time.      Cranial Nerves: No cranial nerve deficit.      Sensory: No sensory deficit.      Motor: No abnormal muscle tone.      Coordination: Coordination normal.      Deep Tendon Reflexes: Reflexes normal.   Psychiatric:         Behavior: Behavior normal.         Thought Content: Thought content normal.         Judgment: Judgment normal.          Procedures     Vitals:    06/30/21 1118   BP: 130/76   BP Location: Left arm  "  Patient Position: Sitting   Cuff Size: Adult   Pulse: 64   Temp: 98.4 °F (36.9 °C)   Weight: 65.8 kg (145 lb)   Height: 156.2 cm (61.5\")   PainSc: 0-No pain       Patient's Body mass index is 26.95 kg/m². indicating that she is overweight (BMI 25-29.9). Obesity-related health conditions include the following: hypertension and dyslipidemias. Obesity is unchanged. BMI is is above average; BMI management plan is completed. We discussed portion control, increasing exercise and joining a fitness center or start home based exercise program..      Assessment/Plan   Problem List Items Addressed This Visit        Cardiac and Vasculature    Benign essential hypertension    Relevant Medications    lisinopril (PRINIVIL,ZESTRIL) 40 MG tablet    metoprolol tartrate (LOPRESSOR) 25 MG tablet    amLODIPine (NORVASC) 5 MG tablet    Other Relevant Orders    Microalbumin / Creatinine Urine Ratio - Urine, Clean Catch    Mixed hyperlipidemia    Relevant Orders    Comprehensive Metabolic Panel    Lipid Panel       Mental Health    Major depressive disorder with single episode, in full remission (CMS/[facility])    Relevant Medications    ALPRAZolam (Xanax) 0.5 MG tablet    FLUoxetine (PROzac) 20 MG capsule      Other Visit Diagnoses     Preventative health care    -  Primary    Encounter for hepatitis C screening test for low risk patient        Relevant Orders    Hepatitis C antibody    Abnormal glucose        Relevant Orders    Hemoglobin A1c        Patient Self-Management and Personalized Health Advice  The patient has been provided with information about: diet, exercise and weight management and preventive services including:   · Annual Wellness Visit (AWV).    Outpatient Encounter Medications as of 6/30/2021   Medication Sig Dispense Refill   • albuterol sulfate HFA (ProAir HFA) 108 (90 Base) MCG/ACT inhaler Inhale 2 puffs Every 6 (Six) Hours As Needed for Wheezing. 1 inhaler 5   • ALPRAZolam (Xanax) 0.5 MG tablet Take 1 tablet twice a " day as needed for anxiety 60 tablet 2   • amLODIPine (NORVASC) 5 MG tablet TAKE ONE TABLET BY MOUTH DAILY 60 tablet 5   • Calcium Carb-Cholecalciferol (CALCIUM 1000 + D) 1000-800 MG-UNIT tablet Take  by mouth.     • diphenhydrAMINE (BENADRYL) 25 MG tablet Take 25 mg by mouth At Night As Needed for Sleep.     • fexofenadine (ALLEGRA) 180 MG tablet Take 1 tablet by mouth Daily.     • FLUoxetine (PROzac) 20 MG capsule Take 1 capsule by mouth Daily. 30 capsule 5   • fluticasone-salmeterol (ADVAIR) 100-50 MCG/DOSE DISKUS INHALE ONE PUFF BY MOUTH TWICE A DAY 60 each 10   • Hydrocortisone, Perianal, (ANUSOL-HC) 2.5 % rectal cream Insert  into the rectum 2 (Two) Times a Day. 28 g 0   • lisinopril (PRINIVIL,ZESTRIL) 40 MG tablet TAKE ONE TABLET BY MOUTH DAILY 90 tablet 4   • metoprolol tartrate (LOPRESSOR) 25 MG tablet TAKE ONE TABLET BY MOUTH TWICE A DAY 60 tablet 5   • Multiple Vitamins-Minerals (MULTI FOR HER 50+ PO) Take  by mouth.     • PREMARIN 0.625 MG/GM vaginal cream Insert 1 g into the vagina 2 (Two) Times a Week. Sometimes 3 x week       No facility-administered encounter medications on file as of 6/30/2021.     Plan    Overall she remains in good health.  She plans to resume exercise by doing Pilates soon.  She is up-to-date on GYN care and mammography.  She is up-to-date on colonoscopy.  She is fully vaccinated against COVID-19.  She had a pneumococcal vaccine at the pharmacy but does not know which one.  She will find out.  Also recommended the Shingrix.    Blood pressure is well controlled on amlodipine, lisinopril and metoprolol.    Lipid panel is pending, she will continue efforts at healthy diet and weight control.    A1c is pending, the treatment is healthy diet and avoidance of weight gain.    Her mood is well compensated on fluoxetine, her depression is in remission.    Reviewed use of high risk medication in the elderly: yes  Reviewed for potential of harmful drug interactions in the elderly:  yes    Follow Up:  Return in about 1 year (around 6/30/2022) for Medicare Wellness.     There are no Patient Instructions on file for this visit.    An After Visit Summary and PPPS with all of these plans were given to the patient.

## 2021-07-01 ENCOUNTER — LAB (OUTPATIENT)
Dept: LAB | Facility: HOSPITAL | Age: 66
End: 2021-07-01

## 2021-07-01 DIAGNOSIS — R73.09 ABNORMAL GLUCOSE: ICD-10-CM

## 2021-07-01 DIAGNOSIS — E78.2 MIXED HYPERLIPIDEMIA: ICD-10-CM

## 2021-07-01 DIAGNOSIS — I10 BENIGN ESSENTIAL HYPERTENSION: ICD-10-CM

## 2021-07-01 DIAGNOSIS — Z11.59 ENCOUNTER FOR HEPATITIS C SCREENING TEST FOR LOW RISK PATIENT: ICD-10-CM

## 2021-07-01 LAB
ALBUMIN SERPL-MCNC: 4.5 G/DL (ref 3.5–5.2)
ALBUMIN UR-MCNC: <1.2 MG/DL
ALBUMIN/GLOB SERPL: 1.7 G/DL
ALP SERPL-CCNC: 83 U/L (ref 39–117)
ALT SERPL W P-5'-P-CCNC: 26 U/L (ref 1–33)
ANION GAP SERPL CALCULATED.3IONS-SCNC: 16.6 MMOL/L (ref 5–15)
AST SERPL-CCNC: 27 U/L (ref 1–32)
BILIRUB SERPL-MCNC: 0.7 MG/DL (ref 0–1.2)
BUN SERPL-MCNC: 13 MG/DL (ref 8–23)
BUN/CREAT SERPL: 22 (ref 7–25)
CALCIUM SPEC-SCNC: 8.9 MG/DL (ref 8.6–10.5)
CHLORIDE SERPL-SCNC: 101 MMOL/L (ref 98–107)
CHOLEST SERPL-MCNC: 238 MG/DL (ref 0–200)
CO2 SERPL-SCNC: 22.4 MMOL/L (ref 22–29)
CREAT SERPL-MCNC: 0.59 MG/DL (ref 0.57–1)
CREAT UR-MCNC: 34.8 MG/DL
GFR SERPL CREATININE-BSD FRML MDRD: 102 ML/MIN/1.73
GLOBULIN UR ELPH-MCNC: 2.6 GM/DL
GLUCOSE SERPL-MCNC: 83 MG/DL (ref 65–99)
HBA1C MFR BLD: 5.18 % (ref 4.8–5.6)
HCV AB SER DONR QL: NORMAL
HDLC SERPL-MCNC: 60 MG/DL (ref 40–60)
LDLC SERPL CALC-MCNC: 155 MG/DL (ref 0–100)
LDLC/HDLC SERPL: 2.53 {RATIO}
MICROALBUMIN/CREAT UR: NORMAL MG/G{CREAT}
POTASSIUM SERPL-SCNC: 4.4 MMOL/L (ref 3.5–5.2)
PROT SERPL-MCNC: 7.1 G/DL (ref 6–8.5)
SODIUM SERPL-SCNC: 140 MMOL/L (ref 136–145)
TRIGL SERPL-MCNC: 130 MG/DL (ref 0–150)
VLDLC SERPL-MCNC: 23 MG/DL (ref 5–40)

## 2021-07-01 PROCEDURE — 80053 COMPREHEN METABOLIC PANEL: CPT

## 2021-07-01 PROCEDURE — 83036 HEMOGLOBIN GLYCOSYLATED A1C: CPT

## 2021-07-01 PROCEDURE — 86803 HEPATITIS C AB TEST: CPT

## 2021-07-01 PROCEDURE — 36415 COLL VENOUS BLD VENIPUNCTURE: CPT

## 2021-07-01 PROCEDURE — 82043 UR ALBUMIN QUANTITATIVE: CPT

## 2021-07-01 PROCEDURE — 80061 LIPID PANEL: CPT

## 2021-07-01 PROCEDURE — 82570 ASSAY OF URINE CREATININE: CPT

## 2021-08-13 RX ORDER — FLUOXETINE HYDROCHLORIDE 20 MG/1
CAPSULE ORAL
Qty: 90 CAPSULE | Refills: 1 | Status: SHIPPED | OUTPATIENT
Start: 2021-08-13 | End: 2022-02-11

## 2022-02-11 RX ORDER — FLUOXETINE HYDROCHLORIDE 20 MG/1
CAPSULE ORAL
Qty: 90 CAPSULE | Refills: 1 | Status: SHIPPED | OUTPATIENT
Start: 2022-02-11 | End: 2022-08-15

## 2022-03-25 RX ORDER — LISINOPRIL 40 MG/1
TABLET ORAL
Qty: 90 TABLET | Refills: 4 | Status: SHIPPED | OUTPATIENT
Start: 2022-03-25

## 2022-04-13 ENCOUNTER — TRANSCRIBE ORDERS (OUTPATIENT)
Dept: ADMINISTRATIVE | Facility: HOSPITAL | Age: 67
End: 2022-04-13

## 2022-04-13 DIAGNOSIS — Z12.31 VISIT FOR SCREENING MAMMOGRAM: Primary | ICD-10-CM

## 2022-05-24 NOTE — TELEPHONE ENCOUNTER
Caller: Xochilt Vasquez    Relationship: Self    Best call back number: [phone]     Medication needed:   ALPRAZolam (Xanax) 0.5 MG tablet   2 ordered         Summary: Take 1 tablet twice a day as needed for anxiety, Normal            When do you need the refill by: TODAY    What details did the patient provide when requesting the medication: PATIENT STATES THAT PHARMACY DOES NOT HAVE ANY REFILLS NEW PRESCRIPTION NEEDED    Does the patient have less than a 3 day supply:  [x] Yes  [] No    What is the patient's preferred pharmacy:       [facility] [address] [facility] [phone] [facility] [phone] [facility]        
Last refill:  6/29/20 #60/2  Last office visit:  6/29/20  Next office visit:  6/30/21  
Statement Selected

## 2022-05-31 ENCOUNTER — HOSPITAL ENCOUNTER (OUTPATIENT)
Dept: MAMMOGRAPHY | Facility: HOSPITAL | Age: 67
Discharge: HOME | End: 2022-05-31
Admitting: OBSTETRICS & GYNECOLOGY

## 2022-05-31 DIAGNOSIS — Z12.31 VISIT FOR SCREENING MAMMOGRAM: ICD-10-CM

## 2022-05-31 PROCEDURE — 77067 SCR MAMMO BI INCL CAD: CPT

## 2022-05-31 PROCEDURE — 77063 BREAST TOMOSYNTHESIS BI: CPT | Performed by: RADIOLOGY

## 2022-05-31 PROCEDURE — 77067 SCR MAMMO BI INCL CAD: CPT | Performed by: RADIOLOGY

## 2022-05-31 PROCEDURE — 77063 BREAST TOMOSYNTHESIS BI: CPT

## 2022-06-01 DIAGNOSIS — F41.9 ANXIETY: ICD-10-CM

## 2022-06-01 RX ORDER — ALPRAZOLAM 0.5 MG/1
TABLET ORAL
Qty: 60 TABLET | Refills: 2 | Status: SHIPPED | OUTPATIENT
Start: 2022-06-01

## 2022-06-02 RX ORDER — AZITHROMYCIN 250 MG/1
TABLET, FILM COATED ORAL
Qty: 6 TABLET | Refills: 0 | Status: SHIPPED | OUTPATIENT
Start: 2022-06-02 | End: 2022-07-26

## 2022-06-07 RX ORDER — FLUTICASONE PROPIONATE AND SALMETEROL 100; 50 UG/1; UG/1
POWDER RESPIRATORY (INHALATION)
Qty: 60 EACH | Refills: 10 | Status: SHIPPED | OUTPATIENT
Start: 2022-06-07 | End: 2023-02-16 | Stop reason: SDUPTHER

## 2022-06-21 RX ORDER — AMLODIPINE BESYLATE 5 MG/1
TABLET ORAL
Qty: 90 TABLET | Refills: 1 | Status: SHIPPED | OUTPATIENT
Start: 2022-06-21 | End: 2023-01-02 | Stop reason: SDUPTHER

## 2022-06-21 NOTE — TELEPHONE ENCOUNTER
Rx Refill Note  Requested Prescriptions     Pending Prescriptions Disp Refills   • amLODIPine (NORVASC) 5 MG tablet [Pharmacy Med Name: amLODIPine BESYLATE 5 MG TAB] 90 tablet      Sig: TAKE ONE TABLET BY MOUTH DAILY      Last office visit with prescribing clinician: 6/30/2021      Next office visit with prescribing clinician: 7/26/2022            Lakia Rajput RN  06/21/22, 17:06 EDT

## 2022-07-26 ENCOUNTER — OFFICE VISIT (OUTPATIENT)
Dept: INTERNAL MEDICINE | Facility: CLINIC | Age: 67
End: 2022-07-26

## 2022-07-26 ENCOUNTER — LAB (OUTPATIENT)
Dept: LAB | Facility: HOSPITAL | Age: 67
End: 2022-07-26

## 2022-07-26 VITALS
DIASTOLIC BLOOD PRESSURE: 88 MMHG | TEMPERATURE: 98 F | WEIGHT: 152.6 LBS | HEART RATE: 72 BPM | SYSTOLIC BLOOD PRESSURE: 168 MMHG | BODY MASS INDEX: 27.04 KG/M2 | HEIGHT: 63 IN

## 2022-07-26 DIAGNOSIS — E78.2 MIXED HYPERLIPIDEMIA: ICD-10-CM

## 2022-07-26 DIAGNOSIS — I10 PRIMARY HYPERTENSION: ICD-10-CM

## 2022-07-26 DIAGNOSIS — F32.5 MAJOR DEPRESSIVE DISORDER WITH SINGLE EPISODE, IN FULL REMISSION: ICD-10-CM

## 2022-07-26 DIAGNOSIS — R73.09 ABNORMAL GLUCOSE: ICD-10-CM

## 2022-07-26 DIAGNOSIS — J45.20 MILD INTERMITTENT ASTHMA WITHOUT COMPLICATION: ICD-10-CM

## 2022-07-26 DIAGNOSIS — Z00.00 PREVENTATIVE HEALTH CARE: Primary | ICD-10-CM

## 2022-07-26 LAB
BASOPHILS # BLD AUTO: 0.04 10*3/MM3 (ref 0–0.2)
BASOPHILS NFR BLD AUTO: 1.2 % (ref 0–1.5)
DEPRECATED RDW RBC AUTO: 44.8 FL (ref 37–54)
EOSINOPHIL # BLD AUTO: 0.09 10*3/MM3 (ref 0–0.4)
EOSINOPHIL NFR BLD AUTO: 2.7 % (ref 0.3–6.2)
ERYTHROCYTE [DISTWIDTH] IN BLOOD BY AUTOMATED COUNT: 12.8 % (ref 12.3–15.4)
HCT VFR BLD AUTO: 41.1 % (ref 34–46.6)
HGB BLD-MCNC: 14.2 G/DL (ref 12–15.9)
IMM GRANULOCYTES # BLD AUTO: 0.01 10*3/MM3 (ref 0–0.05)
IMM GRANULOCYTES NFR BLD AUTO: 0.3 % (ref 0–0.5)
LYMPHOCYTES # BLD AUTO: 1.24 10*3/MM3 (ref 0.7–3.1)
LYMPHOCYTES NFR BLD AUTO: 37.6 % (ref 19.6–45.3)
MCH RBC QN AUTO: 33.6 PG (ref 26.6–33)
MCHC RBC AUTO-ENTMCNC: 34.5 G/DL (ref 31.5–35.7)
MCV RBC AUTO: 97.2 FL (ref 79–97)
MONOCYTES # BLD AUTO: 0.28 10*3/MM3 (ref 0.1–0.9)
MONOCYTES NFR BLD AUTO: 8.5 % (ref 5–12)
NEUTROPHILS NFR BLD AUTO: 1.64 10*3/MM3 (ref 1.7–7)
NEUTROPHILS NFR BLD AUTO: 49.7 % (ref 42.7–76)
NRBC BLD AUTO-RTO: 0 /100 WBC (ref 0–0.2)
PLATELET # BLD AUTO: 276 10*3/MM3 (ref 140–450)
PMV BLD AUTO: 10.2 FL (ref 6–12)
RBC # BLD AUTO: 4.23 10*6/MM3 (ref 3.77–5.28)
WBC NRBC COR # BLD: 3.3 10*3/MM3 (ref 3.4–10.8)

## 2022-07-26 PROCEDURE — 82043 UR ALBUMIN QUANTITATIVE: CPT

## 2022-07-26 PROCEDURE — G0439 PPPS, SUBSEQ VISIT: HCPCS | Performed by: INTERNAL MEDICINE

## 2022-07-26 PROCEDURE — 82570 ASSAY OF URINE CREATININE: CPT

## 2022-07-26 PROCEDURE — 1170F FXNL STATUS ASSESSED: CPT | Performed by: INTERNAL MEDICINE

## 2022-07-26 PROCEDURE — 83036 HEMOGLOBIN GLYCOSYLATED A1C: CPT

## 2022-07-26 PROCEDURE — 1159F MED LIST DOCD IN RCRD: CPT | Performed by: INTERNAL MEDICINE

## 2022-07-26 PROCEDURE — 82172 ASSAY OF APOLIPOPROTEIN: CPT

## 2022-07-26 PROCEDURE — 36415 COLL VENOUS BLD VENIPUNCTURE: CPT

## 2022-07-26 PROCEDURE — 80061 LIPID PANEL: CPT

## 2022-07-26 PROCEDURE — 80053 COMPREHEN METABOLIC PANEL: CPT

## 2022-07-26 PROCEDURE — 85025 COMPLETE CBC W/AUTO DIFF WBC: CPT

## 2022-07-26 RX ORDER — METOPROLOL SUCCINATE 50 MG/1
50 TABLET, EXTENDED RELEASE ORAL DAILY
Qty: 90 TABLET | Refills: 3 | Status: SHIPPED | OUTPATIENT
Start: 2022-07-26

## 2022-07-26 NOTE — PROGRESS NOTES
QUICK REFERENCE INFORMATION:  The ABCs of the Annual Wellness Visit    Subsequent Medicare Wellness Visit    HEALTH RISK ASSESSMENT    1955    Recent Hospitalizations:  No hospitalization(s) within the last year..        Current Medical Providers:  Patient Care Team:  Dilip Carrero MD as PCP - General  Dilip Carrero MD as PCP - Family Medicine        Smoking Status:  Social History     Tobacco Use   Smoking Status Former Smoker   • Packs/day: 0.25   • Years: 20.00   • Pack years: 5.00   • Types: Cigarettes   • Quit date: 2/22/2012   • Years since quitting: 10.4   Smokeless Tobacco Never Used   Tobacco Comment    0.1 PPD for 20 years Quit 01/01/2012       Alcohol Consumption:  Social History     Substance and Sexual Activity   Alcohol Use Yes    Comment: moderate       Depression Screen:   PHQ-2/PHQ-9 Depression Screening 7/26/2022   Retired PHQ-9 Total Score -   Retired Total Score -   Little Interest or Pleasure in Doing Things 1-->several days   Feeling Down, Depressed or Hopeless 0-->not at all   PHQ-9: Brief Depression Severity Measure Score 1       Health Habits and Functional and Cognitive Screening:  Functional & Cognitive Status 7/26/2022   Do you have difficulty preparing food and eating? No   Do you have difficulty bathing yourself, getting dressed or grooming yourself? No   Do you have difficulty using the toilet? No   Do you have difficulty moving around from place to place? No   Do you have trouble with steps or getting out of a bed or a chair? No   Current Diet Well Balanced Diet   Dental Exam Up to date   Eye Exam Up to date   Exercise (times per week) 3 times per week        Exercise Frequency Comment -   Current Exercises Include Walking   Current Exercise Activities Include -   Do you need help using the phone?  No   Are you deaf or do you have serious difficulty hearing?  No   Do you need help with transportation? No   Do you need help shopping? No   Do you need help  preparing meals?  No   Do you need help with housework?  No   Do you need help with laundry? No   Do you need help taking your medications? No   Do you need help managing money? No   Do you ever drive or ride in a car without wearing a seat belt? No   Have you felt unusual stress, anger or loneliness in the last month? No   Who do you live with? Alone   If you need help, do you have trouble finding someone available to you? No   Have you been bothered in the last four weeks by sexual problems? No   Do you have difficulty concentrating, remembering or making decisions? (No Data)       Fall Risk Screen:  SHERRI Fall Risk Assessment was completed, and patient is at LOW risk for falls.Assessment completed on:7/26/2022    ACE III MINI        Does the patient have evidence of cognitive impairment? No    Aspirin use counseling: Does not need ASA (and currently is not on it)    Recent Lab Results:  CMP:  Lab Results   Component Value Date    BUN 11 07/26/2022    CREATININE 0.62 07/26/2022    EGFRIFNONA 102 07/01/2021    BCR 17.7 07/26/2022     07/26/2022    K 4.5 07/26/2022    CO2 26.6 07/26/2022    CALCIUM 9.4 07/26/2022    ALBUMIN 4.80 07/26/2022    BILITOT 1.2 07/26/2022    ALKPHOS 84 07/26/2022    AST 56 (H) 07/26/2022    ALT 52 (H) 07/26/2022     HbA1c:  Lab Results   Component Value Date    HGBA1C 5.10 07/26/2022    HGBA1C 5.18 07/01/2021     Microalbumin:  Lab Results   Component Value Date    MICROALBUR <1.2 07/26/2022     Lipid Panel  Lab Results   Component Value Date    CHOL 267 (H) 07/26/2022    TRIG 125 07/26/2022    HDL 76 (H) 07/26/2022     (H) 07/26/2022    AST 56 (H) 07/26/2022    ALT 52 (H) 07/26/2022       Visual Acuity:  No exam data present    Age-appropriate Screening Schedule:  Refer to the list below for future screening recommendations based on patient's age, sex and/or medical conditions. Orders for these recommended tests are listed in the plan section. The patient has been provided  with a written plan.    Health Maintenance   Topic Date Due   • TDAP/TD VACCINES (1 - Tdap) Never done   • ZOSTER VACCINE (1 of 2) Never done   • INFLUENZA VACCINE  10/01/2022   • DXA SCAN  11/12/2022   • PAP SMEAR  06/30/2023   • LIPID PANEL  07/26/2023   • MAMMOGRAM  05/31/2024        Subjective   History of Present Illness    Xochilt Vasquez is a 67 y.o. female who presents for a Subsequent Wellness Visit and for follow-up of hypertension, hyperlipidemia, depression, and asthma.    Hypertension  The patient reports that she has not been checking her blood pressure at home. She relays that she was in [facility] a few weeks ago because of a cough and it was good that day. She states that she has had a headache a few days in the last week. The patient reports that she sometimes forgets to take her second dose of metoprolol. She adds that she has gained 10 pounds.    Depression  The patient states that she thinks Prozac is working well for her. She states that she is sleeping much better. She adds that she will use Xanax occasionally.    CHRONIC CONDITIONS    The following portions of the patient's history were reviewed and updated as appropriate: allergies, current medications, past family history, past medical history, past social history, past surgical history and problem list.    Outpatient Medications Prior to Visit   Medication Sig Dispense Refill   • albuterol sulfate HFA (ProAir HFA) 108 (90 Base) MCG/ACT inhaler Inhale 2 puffs Every 6 (Six) Hours As Needed for Wheezing. 1 inhaler 5   • ALPRAZolam (Xanax) 0.5 MG tablet Take 1 tablet twice a day as needed for anxiety 60 tablet 2   • amLODIPine (NORVASC) 5 MG tablet TAKE ONE TABLET BY MOUTH DAILY 90 tablet 1   • Calcium Carb-Cholecalciferol 1000-800 MG-UNIT tablet Take 1,200 mg by mouth Daily. 1000 U Vitamin D     • fexofenadine (ALLEGRA) 180 MG tablet Take 1 tablet by mouth Daily.     • FLUoxetine (PROzac) 20 MG capsule TAKE ONE CAPSULE BY MOUTH DAILY  90 capsule 1   • Fluticasone-Salmeterol (ADVAIR) 100-50 MCG/ACT DISKUS INHALE ONE PUFF BY MOUTH TWICE A DAY 60 each 10   • Hydrocortisone, Perianal, (ANUSOL-HC) 2.5 % rectal cream Insert  into the rectum 2 (Two) Times a Day. (Patient taking differently: Insert 1 application into the rectum 2 (Two) Times a Day As Needed.) 28 g 0   • lisinopril (PRINIVIL,ZESTRIL) 40 MG tablet TAKE ONE TABLET BY MOUTH DAILY 90 tablet 4   • Multiple Vitamins-Minerals (MULTI FOR HER 50+ PO) Take  by mouth.     • PREMARIN 0.625 MG/GM vaginal cream Insert 1 g into the vagina 2 (Two) Times a Week. Sometimes 3 x week     • metoprolol tartrate (LOPRESSOR) 25 MG tablet TAKE ONE TABLET BY MOUTH TWICE A  tablet 1   • azithromycin (Zithromax Z-Dylan) 250 MG tablet Take 2 tablets the first day, then 1 tablet daily for 4 days. 6 tablet 0   • diphenhydrAMINE (BENADRYL) 25 MG tablet Take 25 mg by mouth At Night As Needed for Sleep.       No facility-administered medications prior to visit.       Patient Active Problem List   Diagnosis   • Allergic reaction   • AR (allergic rhinitis)   • Asthma   • Primary hypertension   • Depressive disorder   • Loss of hair   • Mixed hyperlipidemia   • Primary insomnia   • Major depressive disorder with single episode, in full remission (formerly [facility])       Advance Care Planning:  ACP discussion was held with the patient during this visit. Patient has an advance directive (not in EMR), copy requested.    Identification of Risk Factors:  Risk factors include: Advance Directive Discussion.    Review of Systems  Review of systems is otherwise unremarkable.    Compared to one year ago, the patient feels her physical health is the same.  Compared to one year ago, the patient feels her mental health is the same.    Objective     Physical Exam  Vitals and nursing note reviewed.   Constitutional:       Appearance: She is well-developed.   HENT:      Head: Normocephalic and atraumatic.      Right Ear: External ear normal.       "Left Ear: External ear normal.      Nose: Nose normal.      Mouth/Throat:      Pharynx: No oropharyngeal exudate.   Eyes:      Conjunctiva/sclera: Conjunctivae normal.      Pupils: Pupils are equal, round, and reactive to light.   Neck:      Thyroid: No thyromegaly.      Vascular: No JVD.   Cardiovascular:      Rate and Rhythm: Normal rate and regular rhythm.      Heart sounds: Normal heart sounds. No murmur heard.    No friction rub. No gallop.   Pulmonary:      Effort: Pulmonary effort is normal. No respiratory distress.      Breath sounds: Normal breath sounds. No wheezing or rales.   Chest:      Chest wall: No tenderness.   Abdominal:      General: Bowel sounds are normal. There is no distension.      Palpations: Abdomen is soft. There is no mass.      Tenderness: There is no abdominal tenderness. There is no guarding or rebound.      Hernia: No hernia is present.   Musculoskeletal:         General: No tenderness. Normal range of motion.      Cervical back: Normal range of motion and neck supple.   Lymphadenopathy:      Cervical: No cervical adenopathy.   Skin:     General: Skin is warm and dry.      Findings: No erythema or rash.   Neurological:      Mental Status: She is alert and oriented to person, place, and time.      Cranial Nerves: No cranial nerve deficit.      Sensory: No sensory deficit.      Motor: No abnormal muscle tone.      Coordination: Coordination normal.      Deep Tendon Reflexes: Reflexes normal.   Psychiatric:         Behavior: Behavior normal.         Thought Content: Thought content normal.         Judgment: Judgment normal.          Procedures     Vitals:    07/26/22 1209   BP: 168/88   BP Location: Left arm   Patient Position: Sitting   Cuff Size: Adult   Pulse: 72   Temp: 98 °F (36.7 °C)   Weight: 69.2 kg (152 lb 9.6 oz)   Height: 159 cm (62.6\")   PainSc: 0-No pain       BMI is >= 25 and <30. (Overweight) The following options were offered after discussion;: exercise " counseling/recommendations and nutrition counseling/recommendations      Assessment & Plan   Problem List Items Addressed This Visit        Cardiac and Vasculature    Primary hypertension    Relevant Medications    lisinopril (PRINIVIL,ZESTRIL) 40 MG tablet    amLODIPine (NORVASC) 5 MG tablet    metoprolol succinate XL (Toprol XL) 50 MG 24 hr tablet    Other Relevant Orders    CBC & Differential (Completed)    Microalbumin / Creatinine Urine Ratio - Urine, Clean Catch (Completed)    Mixed hyperlipidemia    Relevant Orders    Comprehensive Metabolic Panel (Completed)    Lipid Panel (Completed)    Apolipoprotein B (Completed)       Mental Health    Major depressive disorder with single episode, in full remission (HCC)    Relevant Medications    FLUoxetine (PROzac) 20 MG capsule    ALPRAZolam (Xanax) 0.5 MG tablet       Pulmonary and Pneumonias    Asthma    Relevant Medications    albuterol sulfate HFA (ProAir HFA) 108 (90 Base) MCG/ACT inhaler    Fluticasone-Salmeterol (ADVAIR) 100-50 MCG/ACT DISKUS      Other Visit Diagnoses     Preventative health care    -  Primary    Abnormal glucose        Relevant Orders    Hemoglobin A1c (Completed)        Patient Self-Management and Personalized Health Advice  The patient has been provided with information about: diet, exercise and weight management and preventive services including:   · Annual Wellness Visit (AWV).    Outpatient Encounter Medications as of 7/26/2022   Medication Sig Dispense Refill   • albuterol sulfate HFA (ProAir HFA) 108 (90 Base) MCG/ACT inhaler Inhale 2 puffs Every 6 (Six) Hours As Needed for Wheezing. 1 inhaler 5   • ALPRAZolam (Xanax) 0.5 MG tablet Take 1 tablet twice a day as needed for anxiety 60 tablet 2   • amLODIPine (NORVASC) 5 MG tablet TAKE ONE TABLET BY MOUTH DAILY 90 tablet 1   • Calcium Carb-Cholecalciferol 1000-800 MG-UNIT tablet Take 1,200 mg by mouth Daily. 1000 U Vitamin D     • fexofenadine (ALLEGRA) 180 MG tablet Take 1 tablet by mouth  Daily.     • FLUoxetine (PROzac) 20 MG capsule TAKE ONE CAPSULE BY MOUTH DAILY 90 capsule 1   • Fluticasone-Salmeterol (ADVAIR) 100-50 MCG/ACT DISKUS INHALE ONE PUFF BY MOUTH TWICE A DAY 60 each 10   • Hydrocortisone, Perianal, (ANUSOL-HC) 2.5 % rectal cream Insert  into the rectum 2 (Two) Times a Day. (Patient taking differently: Insert 1 application into the rectum 2 (Two) Times a Day As Needed.) 28 g 0   • lisinopril (PRINIVIL,ZESTRIL) 40 MG tablet TAKE ONE TABLET BY MOUTH DAILY 90 tablet 4   • Multiple Vitamins-Minerals (MULTI FOR HER 50+ PO) Take  by mouth.     • PREMARIN 0.625 MG/GM vaginal cream Insert 1 g into the vagina 2 (Two) Times a Week. Sometimes 3 x week     • [DISCONTINUED] metoprolol tartrate (LOPRESSOR) 25 MG tablet TAKE ONE TABLET BY MOUTH TWICE A  tablet 1   • metoprolol succinate XL (Toprol XL) 50 MG 24 hr tablet Take 1 tablet by mouth Daily. 90 tablet 3   • [DISCONTINUED] azithromycin (Zithromax Z-Dylan) 250 MG tablet Take 2 tablets the first day, then 1 tablet daily for 4 days. 6 tablet 0   • [DISCONTINUED] diphenhydrAMINE (BENADRYL) 25 MG tablet Take 25 mg by mouth At Night As Needed for Sleep.       No facility-administered encounter medications on file as of 7/26/2022.     1. Prevention  - Overall, she remains in good health, but needs to improve lifestyle habits. We discussed this at length, including a healthy diet. She is up to date on GYN care, mammography, and colorectal cancer screening. She is going to check on her pneumococcal vaccine.    2. Hypertension  - Blood pressure is elevated today. It has been normal. For now, she will continue amlodipine and lisinopril. She admits she has not been taking metoprolol reliably, so we will change metoprolol tartrate to metoprolol succinate 50 mg once a day. I have also asked her to monitor her blood pressure. Goal is 130/80 mmHg and below.    3. Hyperlipidemia  - Lipid panel is pending. Cholesterol has been creeping up. If it is higher  than last year, she will be given a year to get this down before we start statin therapy.    4. Depression  - Mood appears to be well controlled on fluoxetine. Depression is in remission.    5. Asthma  - Asthma is currently asymptomatic. She uses albuterol as needed. She will continue Advair.    6. Abnormal glucose  - Treatment remains avoidance of weight gain and healthy diet.    Reviewed use of high risk medication in the elderly: yes  Reviewed for potential of harmful drug interactions in the elderly: yes    Follow Up:  Return in about 1 year (around 7/26/2023) for Medicare Wellness.   Return in 1 year.  There are no Patient Instructions on file for this visit.    An After Visit Summary and PPPS with all of these plans were given to the patient.          Transcribed from ambient dictation for Dilip Carrero MD by Tejal Cartagena.  07/26/22   13:30 EDT    Patient verbalized consent to the visit recording.

## 2022-07-27 LAB
ALBUMIN SERPL-MCNC: 4.8 G/DL (ref 3.5–5.2)
ALBUMIN UR-MCNC: <1.2 MG/DL
ALBUMIN/GLOB SERPL: 2 G/DL
ALP SERPL-CCNC: 84 U/L (ref 39–117)
ALT SERPL W P-5'-P-CCNC: 52 U/L (ref 1–33)
ANION GAP SERPL CALCULATED.3IONS-SCNC: 13.4 MMOL/L (ref 5–15)
AST SERPL-CCNC: 56 U/L (ref 1–32)
BILIRUB SERPL-MCNC: 1.2 MG/DL (ref 0–1.2)
BUN SERPL-MCNC: 11 MG/DL (ref 8–23)
BUN/CREAT SERPL: 17.7 (ref 7–25)
CALCIUM SPEC-SCNC: 9.4 MG/DL (ref 8.6–10.5)
CHLORIDE SERPL-SCNC: 99 MMOL/L (ref 98–107)
CHOLEST SERPL-MCNC: 267 MG/DL (ref 0–200)
CO2 SERPL-SCNC: 26.6 MMOL/L (ref 22–29)
CREAT SERPL-MCNC: 0.62 MG/DL (ref 0.57–1)
CREAT UR-MCNC: 57.3 MG/DL
EGFRCR SERPLBLD CKD-EPI 2021: 97.7 ML/MIN/1.73
GLOBULIN UR ELPH-MCNC: 2.4 GM/DL
GLUCOSE SERPL-MCNC: 101 MG/DL (ref 65–99)
HBA1C MFR BLD: 5.1 % (ref 4.8–5.6)
HDLC SERPL-MCNC: 76 MG/DL (ref 40–60)
LDLC SERPL CALC-MCNC: 169 MG/DL (ref 0–100)
LDLC/HDLC SERPL: 2.18 {RATIO}
MICROALBUMIN/CREAT UR: NORMAL MG/G{CREAT}
POTASSIUM SERPL-SCNC: 4.5 MMOL/L (ref 3.5–5.2)
PROT SERPL-MCNC: 7.2 G/DL (ref 6–8.5)
SODIUM SERPL-SCNC: 139 MMOL/L (ref 136–145)
TRIGL SERPL-MCNC: 125 MG/DL (ref 0–150)
VLDLC SERPL-MCNC: 22 MG/DL (ref 5–40)

## 2022-07-28 LAB — APO B SERPL-MCNC: 137 MG/DL

## 2022-07-29 ENCOUNTER — HOSPITAL ENCOUNTER (OUTPATIENT)
Dept: MAMMOGRAPHY | Facility: HOSPITAL | Age: 67
Discharge: HOME | End: 2022-07-29

## 2022-07-29 ENCOUNTER — HOSPITAL ENCOUNTER (OUTPATIENT)
Dept: ULTRASOUND IMAGING | Facility: HOSPITAL | Age: 67
Discharge: HOME | End: 2022-07-29

## 2022-07-29 DIAGNOSIS — R92.8 ABNORMAL MAMMOGRAM: ICD-10-CM

## 2022-07-29 PROCEDURE — G0279 TOMOSYNTHESIS, MAMMO: HCPCS | Performed by: RADIOLOGY

## 2022-07-29 PROCEDURE — G0279 TOMOSYNTHESIS, MAMMO: HCPCS

## 2022-07-29 PROCEDURE — 76642 ULTRASOUND BREAST LIMITED: CPT | Performed by: RADIOLOGY

## 2022-07-29 PROCEDURE — 77065 DX MAMMO INCL CAD UNI: CPT | Performed by: RADIOLOGY

## 2022-07-29 PROCEDURE — 77065 DX MAMMO INCL CAD UNI: CPT

## 2022-07-29 PROCEDURE — 76642 ULTRASOUND BREAST LIMITED: CPT

## 2022-08-03 DIAGNOSIS — R79.89 ABNORMAL LIVER FUNCTION TESTS: Primary | ICD-10-CM

## 2022-08-05 DIAGNOSIS — E78.2 MIXED HYPERLIPIDEMIA: Primary | ICD-10-CM

## 2022-08-05 RX ORDER — ROSUVASTATIN CALCIUM 10 MG/1
10 TABLET, COATED ORAL DAILY
Qty: 90 TABLET | Refills: 1 | Status: SHIPPED | OUTPATIENT
Start: 2022-08-05 | End: 2023-01-30

## 2022-08-15 RX ORDER — FLUOXETINE HYDROCHLORIDE 20 MG/1
CAPSULE ORAL
Qty: 90 CAPSULE | Refills: 1 | Status: SHIPPED | OUTPATIENT
Start: 2022-08-15 | End: 2023-02-24

## 2022-09-26 ENCOUNTER — PATIENT MESSAGE (OUTPATIENT)
Dept: INTERNAL MEDICINE | Facility: CLINIC | Age: 67
End: 2022-09-26

## 2022-09-26 DIAGNOSIS — R11.0 NAUSEA: Primary | ICD-10-CM

## 2022-09-26 RX ORDER — PROMETHAZINE HYDROCHLORIDE 25 MG/1
25 TABLET ORAL EVERY 6 HOURS PRN
Qty: 28 TABLET | Refills: 0 | Status: SHIPPED | OUTPATIENT
Start: 2022-09-26 | End: 2022-10-03

## 2022-09-26 NOTE — TELEPHONE ENCOUNTER
From: Xochilt Vasquez  To: Dilip Carrero MD  Sent: 9/26/2022 12:00 PM EDT  Subject: Phenogran     I have a yucky stomach bug. Would you call me in a prescription ASAP! Thank you so much

## 2022-11-16 DIAGNOSIS — E78.2 MIXED HYPERLIPIDEMIA: ICD-10-CM

## 2022-11-16 DIAGNOSIS — I10 PRIMARY HYPERTENSION: Primary | ICD-10-CM

## 2022-11-16 DIAGNOSIS — R73.09 ABNORMAL GLUCOSE: ICD-10-CM

## 2022-11-17 ENCOUNTER — LAB (OUTPATIENT)
Dept: LAB | Facility: HOSPITAL | Age: 67
End: 2022-11-17

## 2022-11-17 DIAGNOSIS — R73.09 ABNORMAL GLUCOSE: ICD-10-CM

## 2022-11-17 DIAGNOSIS — E78.2 MIXED HYPERLIPIDEMIA: ICD-10-CM

## 2022-11-17 LAB
ALBUMIN SERPL-MCNC: 4.6 G/DL (ref 3.5–5.2)
ALBUMIN/GLOB SERPL: 1.6 G/DL
ALP SERPL-CCNC: 92 U/L (ref 39–117)
ALT SERPL W P-5'-P-CCNC: 20 U/L (ref 1–33)
ANION GAP SERPL CALCULATED.3IONS-SCNC: 10.2 MMOL/L (ref 5–15)
AST SERPL-CCNC: 23 U/L (ref 1–32)
BILIRUB SERPL-MCNC: 1 MG/DL (ref 0–1.2)
BUN SERPL-MCNC: 13 MG/DL (ref 8–23)
BUN/CREAT SERPL: 16.9 (ref 7–25)
CALCIUM SPEC-SCNC: 9.6 MG/DL (ref 8.6–10.5)
CHLORIDE SERPL-SCNC: 99 MMOL/L (ref 98–107)
CHOLEST SERPL-MCNC: 150 MG/DL (ref 0–200)
CO2 SERPL-SCNC: 26.8 MMOL/L (ref 22–29)
CREAT SERPL-MCNC: 0.77 MG/DL (ref 0.57–1)
EGFRCR SERPLBLD CKD-EPI 2021: 84.7 ML/MIN/1.73
GLOBULIN UR ELPH-MCNC: 2.8 GM/DL
GLUCOSE SERPL-MCNC: 88 MG/DL (ref 65–99)
HBA1C MFR BLD: 5.4 % (ref 4.8–5.6)
HDLC SERPL-MCNC: 55 MG/DL (ref 40–60)
LDLC SERPL CALC-MCNC: 75 MG/DL (ref 0–100)
LDLC/HDLC SERPL: 1.32 {RATIO}
POTASSIUM SERPL-SCNC: 4.3 MMOL/L (ref 3.5–5.2)
PROT SERPL-MCNC: 7.4 G/DL (ref 6–8.5)
SODIUM SERPL-SCNC: 136 MMOL/L (ref 136–145)
TRIGL SERPL-MCNC: 113 MG/DL (ref 0–150)
VLDLC SERPL-MCNC: 20 MG/DL (ref 5–40)

## 2022-11-17 PROCEDURE — 80053 COMPREHEN METABOLIC PANEL: CPT

## 2022-11-17 PROCEDURE — 83036 HEMOGLOBIN GLYCOSYLATED A1C: CPT

## 2022-11-17 PROCEDURE — 80061 LIPID PANEL: CPT

## 2022-11-17 PROCEDURE — 82172 ASSAY OF APOLIPOPROTEIN: CPT

## 2022-11-17 PROCEDURE — 36415 COLL VENOUS BLD VENIPUNCTURE: CPT

## 2022-11-21 LAB — APO B SERPL-MCNC: 76 MG/DL

## 2022-11-22 ENCOUNTER — TELEPHONE (OUTPATIENT)
Dept: INTERNAL MEDICINE | Facility: CLINIC | Age: 67
End: 2022-11-22

## 2022-12-23 RX ORDER — PROMETHAZINE HYDROCHLORIDE AND PHENYLEPHRINE HYDROCHLORIDE 6.25; 5 MG/5ML; MG/5ML
5 SYRUP ORAL EVERY 4 HOURS PRN
Qty: 118 ML | Refills: 0 | Status: SHIPPED | OUTPATIENT
Start: 2022-12-23 | End: 2023-02-13 | Stop reason: SDUPTHER

## 2023-01-03 RX ORDER — AMLODIPINE BESYLATE 5 MG/1
5 TABLET ORAL DAILY
Qty: 90 TABLET | Refills: 1 | Status: SHIPPED | OUTPATIENT
Start: 2023-01-03

## 2023-01-03 NOTE — TELEPHONE ENCOUNTER
Rx Refill Note  Requested Prescriptions     Pending Prescriptions Disp Refills   • amLODIPine (NORVASC) 5 MG tablet 90 tablet 1     Sig: Take 1 tablet by mouth Daily.      Last office visit with prescribing clinician: 7/26/2022   Last telemedicine visit with prescribing clinician: Visit date not found   Next office visit with prescribing clinician: 7/28/2023                         Would you like a call back once the refill request has been completed: [] Yes [] No    If the office needs to give you a call back, can they leave a voicemail: [] Yes [] No    Yojana Patterson LPN  01/03/23, 09:23 EST

## 2023-01-30 RX ORDER — ROSUVASTATIN CALCIUM 10 MG/1
TABLET, COATED ORAL
Qty: 90 TABLET | Refills: 1 | Status: SHIPPED | OUTPATIENT
Start: 2023-01-30

## 2023-02-13 ENCOUNTER — TELEPHONE (OUTPATIENT)
Dept: INTERNAL MEDICINE | Facility: CLINIC | Age: 68
End: 2023-02-13

## 2023-02-13 RX ORDER — PROMETHAZINE HYDROCHLORIDE AND PHENYLEPHRINE HYDROCHLORIDE 6.25; 5 MG/5ML; MG/5ML
5 SYRUP ORAL EVERY 4 HOURS PRN
Qty: 118 ML | Refills: 0 | Status: SHIPPED | OUTPATIENT
Start: 2023-02-13 | End: 2023-02-14 | Stop reason: SDUPTHER

## 2023-02-13 NOTE — TELEPHONE ENCOUNTER
Patient verbalized understanding.  She also stated Dr. Carrero usually sends in a Z-livia but she will try the cough syrup first.

## 2023-02-13 NOTE — TELEPHONE ENCOUNTER
I spoke to patient she stated she has a cough , runny nose , post nasal drip , wheezing denies all other symptoms  X's 3 days she did a home covid test it was negative

## 2023-02-13 NOTE — TELEPHONE ENCOUNTER
Caller: Xochilt Vasquez    Relationship: Self    Best call back number: [phone]    What medication are you requesting: ANTIBIOTIC, COUGH MEDICATION    What are your current symptoms: COUGH, NASAL DRAINAGE    How long have you been experiencing symptoms: 3 DAYS    Have you had these symptoms before:    [x] Yes  [] No    Have you been treated for these symptoms before:   [x] Yes  [] No    If a prescription is needed, what is your preferred pharmacy and phone number:      [id number] - [place], [place] - [address] [facility] - [phone] [facility] [phone] [facility]        Additional notes:

## 2023-02-14 RX ORDER — PROMETHAZINE HYDROCHLORIDE AND PHENYLEPHRINE HYDROCHLORIDE 6.25; 5 MG/5ML; MG/5ML
5 SYRUP ORAL EVERY 4 HOURS PRN
Qty: 118 ML | Refills: 0 | Status: SHIPPED | OUTPATIENT
Start: 2023-02-14 | End: 2023-02-16 | Stop reason: SDUPTHER

## 2023-02-14 RX ORDER — PROMETHAZINE HYDROCHLORIDE AND PHENYLEPHRINE HYDROCHLORIDE 6.25; 5 MG/5ML; MG/5ML
5 SYRUP ORAL EVERY 4 HOURS PRN
Qty: 118 ML | Refills: 0 | Status: CANCELLED | OUTPATIENT
Start: 2023-02-14

## 2023-02-14 NOTE — TELEPHONE ENCOUNTER
Caller: Maurizio Vasquez    Relationship: Self    Best call back number:[phone]    What is the best time to reach you:     Who are you requesting to speak with (clinical staff, provider,  specific staff member): CLINICAL    Do you know the name of the person who called: MAURIZIO    What was the call regarding: PATIENTS PRESCRIPTION WAS SENT TO A [place] [facility], NEEDS TO BE CORRECTED AND SENT INTO THE LOCATION SPECIFIED    Do you require a callback: YES

## 2023-02-16 RX ORDER — FLUTICASONE PROPIONATE AND SALMETEROL 100; 50 UG/1; UG/1
1 POWDER RESPIRATORY (INHALATION) 2 TIMES DAILY
Qty: 60 EACH | Refills: 10 | Status: SHIPPED | OUTPATIENT
Start: 2023-02-16 | End: 2023-02-25 | Stop reason: SDUPTHER

## 2023-02-16 RX ORDER — PROMETHAZINE HYDROCHLORIDE AND PHENYLEPHRINE HYDROCHLORIDE 6.25; 5 MG/5ML; MG/5ML
5 SYRUP ORAL EVERY 4 HOURS PRN
Qty: 118 ML | Refills: 0 | Status: SHIPPED | OUTPATIENT
Start: 2023-02-16

## 2023-02-24 RX ORDER — FLUOXETINE HYDROCHLORIDE 20 MG/1
CAPSULE ORAL
Qty: 90 CAPSULE | Refills: 1 | Status: SHIPPED | OUTPATIENT
Start: 2023-02-24

## 2023-02-27 RX ORDER — FLUTICASONE PROPIONATE AND SALMETEROL 100; 50 UG/1; UG/1
1 POWDER RESPIRATORY (INHALATION) 2 TIMES DAILY
Qty: 60 EACH | Refills: 10 | Status: SHIPPED | OUTPATIENT
Start: 2023-02-27

## 2023-02-27 RX ORDER — CONJUGATED ESTROGENS 0.62 MG/G
1 CREAM VAGINAL 2 TIMES WEEKLY
Qty: 30 G | Refills: 5 | Status: SHIPPED | OUTPATIENT
Start: 2023-02-27

## 2023-02-27 NOTE — TELEPHONE ENCOUNTER
Rx Refill Note  Requested Prescriptions     Pending Prescriptions Disp Refills   • Premarin 0.625 MG/GM vaginal cream       Sig: Insert 1 g into the vagina 2 (Two) Times a Week. Sometimes 3 x week   • Fluticasone-Salmeterol (ADVAIR/WIXELA) 100-50 MCG/ACT DISKUS 60 each 10     Sig: Inhale 1 puff 2 (Two) Times a Day.      Last office visit with prescribing clinician: 7/26/2022   Next office visit with prescribing clinician: 7/28/2023                         Would you like a call back once the refill request has been completed: [] Yes [] No    If the office needs to give you a call back, can they leave a voicemail: [] Yes [] No    Eun Mesa LPN  02/27/23, 11:04 EST

## 2023-06-05 DIAGNOSIS — F41.9 ANXIETY: ICD-10-CM

## 2023-06-05 RX ORDER — ALPRAZOLAM 0.5 MG/1
TABLET ORAL
Qty: 60 TABLET | Refills: 2 | Status: SHIPPED | OUTPATIENT
Start: 2023-06-05

## 2023-06-05 NOTE — TELEPHONE ENCOUNTER
Rx Refill Note  Requested Prescriptions     Pending Prescriptions Disp Refills    ALPRAZolam (Xanax) 0.5 MG tablet 60 tablet 2     Sig: Take 1 tablet twice a day as needed for anxiety      Last refill:  6/1/22 #60/2  Last office visit with prescribing clinician: 7/26/2022   Last telemedicine visit with prescribing clinician: Visit date not found   Next office visit with prescribing clinician: 7/28/2023                             Lakia Rajput RN  06/05/23, 16:01 EDT

## 2023-06-15 RX ORDER — FLUTICASONE PROPIONATE AND SALMETEROL 100; 50 UG/1; UG/1
1 POWDER RESPIRATORY (INHALATION) 2 TIMES DAILY
Qty: 60 EACH | Refills: 5 | Status: SHIPPED | OUTPATIENT
Start: 2023-06-15 | End: 2023-06-17 | Stop reason: SDUPTHER

## 2023-06-15 NOTE — TELEPHONE ENCOUNTER
Rx Refill Note  Requested Prescriptions     Pending Prescriptions Disp Refills    Fluticasone-Salmeterol (ADVAIR/WIXELA) 100-50 MCG/ACT DISKUS 60 each 10     Sig: Inhale 1 puff 2 (Two) Times a Day.      Last office visit with prescribing clinician: 7/26/2022   Next office visit with prescribing clinician: 7/12/2023                         Would you like a call back once the refill request has been completed: [] Yes [] No    If the office needs to give you a call back, can they leave a voicemail: [] Yes [] No    Eun Mesa LPN  06/15/23, 13:01 EDT

## 2023-06-19 RX ORDER — FLUTICASONE PROPIONATE AND SALMETEROL 100; 50 UG/1; UG/1
1 POWDER RESPIRATORY (INHALATION) 2 TIMES DAILY
Qty: 60 EACH | Refills: 5 | Status: SHIPPED | OUTPATIENT
Start: 2023-06-19

## 2023-07-31 RX ORDER — CONJUGATED ESTROGENS 0.62 MG/G
1 CREAM VAGINAL 2 TIMES WEEKLY
Qty: 30 G | Refills: 5 | Status: SHIPPED | OUTPATIENT
Start: 2023-07-31

## 2023-07-31 RX ORDER — ROSUVASTATIN CALCIUM 10 MG/1
10 TABLET, COATED ORAL DAILY
Qty: 90 TABLET | Refills: 1 | Status: SHIPPED | OUTPATIENT
Start: 2023-07-31

## 2023-08-02 RX ORDER — METOPROLOL SUCCINATE 50 MG/1
50 TABLET, EXTENDED RELEASE ORAL DAILY
Qty: 90 TABLET | Refills: 3 | Status: SHIPPED | OUTPATIENT
Start: 2023-08-02

## 2023-08-10 ENCOUNTER — HOSPITAL ENCOUNTER (OUTPATIENT)
Dept: MAMMOGRAPHY | Facility: HOSPITAL | Age: 68
Discharge: HOME | End: 2023-08-10
Admitting: OBSTETRICS & GYNECOLOGY
Payer: MEDICARE

## 2023-08-10 DIAGNOSIS — Z12.31 SCREENING MAMMOGRAM FOR BREAST CANCER: ICD-10-CM

## 2023-08-10 PROCEDURE — 77067 SCR MAMMO BI INCL CAD: CPT

## 2023-08-10 PROCEDURE — 77063 BREAST TOMOSYNTHESIS BI: CPT

## 2023-08-25 RX ORDER — FLUOXETINE HYDROCHLORIDE 20 MG/1
CAPSULE ORAL
Qty: 90 CAPSULE | Refills: 3 | Status: SHIPPED | OUTPATIENT
Start: 2023-08-25

## 2023-08-25 NOTE — TELEPHONE ENCOUNTER
Rx Refill Note  Requested Prescriptions     Pending Prescriptions Disp Refills    FLUoxetine (PROzac) 20 MG capsule [Pharmacy Med Name: FLUoxetine HCL 20 MG CAPSULE] 90 capsule 1     Sig: TAKE ONE CAPSULE BY MOUTH DAILY      Last office visit with prescribing clinician: 7/26/2022   Last telemedicine visit with prescribing clinician: Visit date not found   Next office visit with prescribing clinician: 11/1/2023                         Would you like a call back once the refill request has been completed: [] Yes [] No    If the office needs to give you a call back, can they leave a voicemail: [] Yes [] No    Yojana Patterson LPN  08/25/23, 09:06 EDT

## 2023-12-07 ENCOUNTER — LAB (OUTPATIENT)
Dept: LAB | Facility: HOSPITAL | Age: 68
End: 2023-12-07
Payer: MEDICARE

## 2023-12-07 ENCOUNTER — OFFICE VISIT (OUTPATIENT)
Dept: INTERNAL MEDICINE | Facility: CLINIC | Age: 68
End: 2023-12-07
Payer: MEDICARE

## 2023-12-07 VITALS
SYSTOLIC BLOOD PRESSURE: 110 MMHG | TEMPERATURE: 97.8 F | HEIGHT: 62 IN | BODY MASS INDEX: 26.72 KG/M2 | DIASTOLIC BLOOD PRESSURE: 60 MMHG | HEART RATE: 68 BPM | WEIGHT: 145.2 LBS

## 2023-12-07 DIAGNOSIS — Z00.00 PREVENTATIVE HEALTH CARE: Primary | ICD-10-CM

## 2023-12-07 DIAGNOSIS — F32.5 MAJOR DEPRESSIVE DISORDER WITH SINGLE EPISODE, IN FULL REMISSION: ICD-10-CM

## 2023-12-07 DIAGNOSIS — E78.2 MIXED HYPERLIPIDEMIA: ICD-10-CM

## 2023-12-07 DIAGNOSIS — J45.20 MILD INTERMITTENT ASTHMA WITHOUT COMPLICATION: ICD-10-CM

## 2023-12-07 DIAGNOSIS — R73.09 ABNORMAL GLUCOSE: ICD-10-CM

## 2023-12-07 DIAGNOSIS — I10 PRIMARY HYPERTENSION: ICD-10-CM

## 2023-12-07 LAB
ALBUMIN SERPL-MCNC: 4.6 G/DL (ref 3.5–5.2)
ALBUMIN UR-MCNC: <1.2 MG/DL
ALBUMIN/GLOB SERPL: 1.8 G/DL
ALP SERPL-CCNC: 72 U/L (ref 39–117)
ALT SERPL W P-5'-P-CCNC: 27 U/L (ref 1–33)
ANION GAP SERPL CALCULATED.3IONS-SCNC: 12.5 MMOL/L (ref 5–15)
AST SERPL-CCNC: 24 U/L (ref 1–32)
BASOPHILS # BLD AUTO: 0.04 10*3/MM3 (ref 0–0.2)
BASOPHILS NFR BLD AUTO: 1.1 % (ref 0–1.5)
BILIRUB SERPL-MCNC: 1.4 MG/DL (ref 0–1.2)
BUN SERPL-MCNC: 12 MG/DL (ref 8–23)
BUN/CREAT SERPL: 17.1 (ref 7–25)
CALCIUM SPEC-SCNC: 9.4 MG/DL (ref 8.6–10.5)
CHLORIDE SERPL-SCNC: 101 MMOL/L (ref 98–107)
CHOLEST SERPL-MCNC: 148 MG/DL (ref 0–200)
CO2 SERPL-SCNC: 23.5 MMOL/L (ref 22–29)
CREAT SERPL-MCNC: 0.7 MG/DL (ref 0.57–1)
CREAT UR-MCNC: 224.3 MG/DL
DEPRECATED RDW RBC AUTO: 43.3 FL (ref 37–54)
EGFRCR SERPLBLD CKD-EPI 2021: 94.3 ML/MIN/1.73
EOSINOPHIL # BLD AUTO: 0.15 10*3/MM3 (ref 0–0.4)
EOSINOPHIL NFR BLD AUTO: 4.2 % (ref 0.3–6.2)
ERYTHROCYTE [DISTWIDTH] IN BLOOD BY AUTOMATED COUNT: 12.5 % (ref 12.3–15.4)
GLOBULIN UR ELPH-MCNC: 2.5 GM/DL
GLUCOSE SERPL-MCNC: 97 MG/DL (ref 65–99)
HBA1C MFR BLD: 5.2 % (ref 4.8–5.6)
HCT VFR BLD AUTO: 38.2 % (ref 34–46.6)
HDLC SERPL-MCNC: 68 MG/DL (ref 40–60)
HGB BLD-MCNC: 13.3 G/DL (ref 12–15.9)
IMM GRANULOCYTES # BLD AUTO: 0.01 10*3/MM3 (ref 0–0.05)
IMM GRANULOCYTES NFR BLD AUTO: 0.3 % (ref 0–0.5)
LDLC SERPL CALC-MCNC: 62 MG/DL (ref 0–100)
LDLC/HDLC SERPL: 0.88 {RATIO}
LYMPHOCYTES # BLD AUTO: 1.21 10*3/MM3 (ref 0.7–3.1)
LYMPHOCYTES NFR BLD AUTO: 33.7 % (ref 19.6–45.3)
MCH RBC QN AUTO: 32.9 PG (ref 26.6–33)
MCHC RBC AUTO-ENTMCNC: 34.8 G/DL (ref 31.5–35.7)
MCV RBC AUTO: 94.6 FL (ref 79–97)
MICROALBUMIN/CREAT UR: NORMAL MG/G{CREAT}
MONOCYTES # BLD AUTO: 0.36 10*3/MM3 (ref 0.1–0.9)
MONOCYTES NFR BLD AUTO: 10 % (ref 5–12)
NEUTROPHILS NFR BLD AUTO: 1.82 10*3/MM3 (ref 1.7–7)
NEUTROPHILS NFR BLD AUTO: 50.7 % (ref 42.7–76)
NRBC BLD AUTO-RTO: 0.3 /100 WBC (ref 0–0.2)
PLATELET # BLD AUTO: 285 10*3/MM3 (ref 140–450)
PMV BLD AUTO: 10.2 FL (ref 6–12)
POTASSIUM SERPL-SCNC: 3.9 MMOL/L (ref 3.5–5.2)
PROT SERPL-MCNC: 7.1 G/DL (ref 6–8.5)
RBC # BLD AUTO: 4.04 10*6/MM3 (ref 3.77–5.28)
SODIUM SERPL-SCNC: 137 MMOL/L (ref 136–145)
TRIGL SERPL-MCNC: 101 MG/DL (ref 0–150)
VLDLC SERPL-MCNC: 18 MG/DL (ref 5–40)
WBC NRBC COR # BLD AUTO: 3.59 10*3/MM3 (ref 3.4–10.8)

## 2023-12-07 PROCEDURE — G0439 PPPS, SUBSEQ VISIT: HCPCS | Performed by: INTERNAL MEDICINE

## 2023-12-07 PROCEDURE — 83036 HEMOGLOBIN GLYCOSYLATED A1C: CPT

## 2023-12-07 PROCEDURE — 82172 ASSAY OF APOLIPOPROTEIN: CPT

## 2023-12-07 PROCEDURE — 82043 UR ALBUMIN QUANTITATIVE: CPT

## 2023-12-07 PROCEDURE — 82570 ASSAY OF URINE CREATININE: CPT

## 2023-12-07 PROCEDURE — 1159F MED LIST DOCD IN RCRD: CPT | Performed by: INTERNAL MEDICINE

## 2023-12-07 PROCEDURE — 3078F DIAST BP <80 MM HG: CPT | Performed by: INTERNAL MEDICINE

## 2023-12-07 PROCEDURE — 36415 COLL VENOUS BLD VENIPUNCTURE: CPT

## 2023-12-07 PROCEDURE — 1160F RVW MEDS BY RX/DR IN RCRD: CPT | Performed by: INTERNAL MEDICINE

## 2023-12-07 PROCEDURE — 80053 COMPREHEN METABOLIC PANEL: CPT

## 2023-12-07 PROCEDURE — 3074F SYST BP LT 130 MM HG: CPT | Performed by: INTERNAL MEDICINE

## 2023-12-07 PROCEDURE — 80061 LIPID PANEL: CPT

## 2023-12-07 PROCEDURE — 85025 COMPLETE CBC W/AUTO DIFF WBC: CPT

## 2023-12-07 RX ORDER — CONJUGATED ESTROGENS 0.62 MG/G
1 CREAM VAGINAL 2 TIMES WEEKLY
Qty: 30 G | Refills: 5 | Status: SHIPPED | OUTPATIENT
Start: 2023-12-07

## 2023-12-07 NOTE — PROGRESS NOTES
QUICK REFERENCE INFORMATION:  The ABCs of the Annual Wellness Visit    Subsequent Medicare Wellness Visit    HEALTH RISK ASSESSMENT    1955    Recent Hospitalizations:  No hospitalization(s) within the last year..        Current Medical Providers:  Patient Care Team:  Dilip Carrero MD as PCP - General  Dilip Carrero MD as PCP - Family Medicine        Smoking Status:  Social History     Tobacco Use   Smoking Status Former    Packs/day: 0.25    Years: 20.00    Additional pack years: 0.00    Total pack years: 5.00    Types: Cigarettes    Quit date: 10/1/2011    Years since quittin.2   Smokeless Tobacco Never   Tobacco Comments    0.1 PPD for 20 years Quit 2012       Alcohol Consumption:  Social History     Substance and Sexual Activity   Alcohol Use Yes    Comment: 8 wine or mixed drink       Depression Screen:       2023     8:39 AM   PHQ-2/PHQ-9 Depression Screening   Little Interest or Pleasure in Doing Things 0-->not at all   Feeling Down, Depressed or Hopeless 0-->not at all   PHQ-9: Brief Depression Severity Measure Score 0       Health Habits and Functional and Cognitive Screenin/7/2023     8:34 AM   Functional & Cognitive Status   Do you have difficulty preparing food and eating? No   Do you have difficulty bathing yourself, getting dressed or grooming yourself? No   Do you have difficulty using the toilet? No   Do you have difficulty moving around from place to place? No   Do you have trouble with steps or getting out of a bed or a chair? No   Current Diet Well Balanced Diet   Dental Exam Up to date   Eye Exam Up to date   Exercise (times per week) 4 times per week   Current Exercises Include Walking;Light Weights   Do you need help using the phone?  No   Are you deaf or do you have serious difficulty hearing?  No   Do you need help to go to places out of walking distance? No   Do you need help shopping? No   Do you need help preparing meals?  No   Do you need  help with housework?  No   Do you need help with laundry? No   Do you need help taking your medications? No   Do you need help managing money? No   Do you ever drive or ride in a car without wearing a seat belt? No   Have you felt unusual stress, anger or loneliness in the last month? No   Who do you live with? Alone   If you need help, do you have trouble finding someone available to you? No   Have you been bothered in the last four weeks by sexual problems? No   Do you have difficulty concentrating, remembering or making decisions? No       Fall Risk Screen:  STEADI Fall Risk Assessment was completed, and patient is at LOW risk for falls.Assessment completed on:12/7/2023    ACE III MINI        Does the patient have evidence of cognitive impairment? No    Aspirin use counseling: Does not need ASA (and currently is not on it)    Recent Lab Results:  CMP:  Lab Results   Component Value Date    BUN 12 12/07/2023    CREATININE 0.70 12/07/2023    EGFRIFNONA 102 07/01/2021    BCR 17.1 12/07/2023     12/07/2023    K 3.9 12/07/2023    CO2 23.5 12/07/2023    CALCIUM 9.4 12/07/2023    ALBUMIN 4.6 12/07/2023    BILITOT 1.4 (H) 12/07/2023    ALKPHOS 72 12/07/2023    AST 24 12/07/2023    ALT 27 12/07/2023     HbA1c:  Lab Results   Component Value Date    HGBA1C 5.20 12/07/2023    HGBA1C 5.40 11/17/2022     Microalbumin:  Lab Results   Component Value Date    MICROALBUR <1.2 12/07/2023     Lipid Panel  Lab Results   Component Value Date    CHOL 148 12/07/2023    TRIG 101 12/07/2023    HDL 68 (H) 12/07/2023    LDL 62 12/07/2023    AST 24 12/07/2023    ALT 27 12/07/2023       Visual Acuity:  No results found.    Age-appropriate Screening Schedule:  Refer to the list below for future screening recommendations based on patient's age, sex and/or medical conditions. Orders for these recommended tests are listed in the plan section. The patient has been provided with a written plan.    Health Maintenance   Topic Date Due     TDAP/TD VACCINES (1 - Tdap) Never done    ZOSTER VACCINE (2 of 2) 09/22/2022    DXA SCAN  11/12/2022    ANNUAL WELLNESS VISIT  07/26/2023    BMI FOLLOWUP  07/26/2023    COVID-19 Vaccine (6 - 2023-24 season) 09/01/2023    LIPID PANEL  12/07/2024    MAMMOGRAM  08/10/2025    COLORECTAL CANCER SCREENING  03/21/2029    HEPATITIS C SCREENING  Completed    INFLUENZA VACCINE  Completed    Pneumococcal Vaccine 65+  Completed    PAP SMEAR  Discontinued        Subjective   History of Present Illness    Xochilt Vasquez is a 68 y.o. female who presents for a Subsequent Medicare Annual Wellness Examination and for follow-up of hypertension and hyperlipidemia.    Hypertension  Her blood pressure is within normal limits today. She notes that 2023 has not been a good year and she had lots of losses, but her health is otherwise normal. She denies any emotional concerns and has been feeling physically well.     Cataracts  She underwent cataract surgery in 2022 and had a correction to her vision without any complications. She is happy that she does not have to wear reading glasses. She had started wearing reading glasses at the age of 40 and has had 20/20 vision her entire life.    Health maintenance  She walks for exercise and has been doing some light weights in the house. She thought she would get COVID-19 vaccine along with her influenza vaccine but decided not to have both at the same time and received only the influenza vaccine. She has had the first shingles vaccine and is due for the 2nd vaccine. She is interested in getting RSV vaccine. She had a bone density scan 3 years ago that was close to normal with very mild osteopenia in the right hip. Her gynecologist, Dr. Felton, had informed her when she turned 65-year-old that she did not need Pap smears anymore. She has yearly ovarian cancer screening and gets her regular mammograms.    CHRONIC CONDITIONS: Hypertension and hyperlipidemia.      The following portions of the  patient's history were reviewed and updated as appropriate: allergies, current medications, past family history, past medical history, past social history, past surgical history, and problem list.    Outpatient Medications Prior to Visit   Medication Sig Dispense Refill    albuterol sulfate HFA (ProAir HFA) 108 (90 Base) MCG/ACT inhaler Inhale 2 puffs Every 6 (Six) Hours As Needed for Wheezing. 1 inhaler 5    ALPRAZolam (Xanax) 0.5 MG tablet Take 1 tablet twice a day as needed for anxiety 60 tablet 2    amLODIPine (NORVASC) 5 MG tablet TAKE ONE TABLET BY MOUTH DAILY 90 tablet 1    Calcium Carb-Cholecalciferol 1000-800 MG-UNIT tablet Take 1,200 mg by mouth Daily. 1000 U Vitamin D      fexofenadine (ALLEGRA) 180 MG tablet Take 1 tablet by mouth Daily.      FLUoxetine (PROzac) 20 MG capsule TAKE ONE CAPSULE BY MOUTH DAILY 90 capsule 3    Fluticasone-Salmeterol (ADVAIR/WIXELA) 100-50 MCG/ACT DISKUS Inhale 1 puff 2 (Two) Times a Day. 60 each 5    lisinopril (PRINIVIL,ZESTRIL) 40 MG tablet TAKE ONE TABLET BY MOUTH DAILY 90 tablet 3    metoprolol succinate XL (Toprol XL) 50 MG 24 hr tablet Take 1 tablet by mouth Daily. 90 tablet 3    Multiple Vitamins-Minerals (MULTI FOR HER 50+ PO) Take  by mouth.      rosuvastatin (CRESTOR) 10 MG tablet Take 1 tablet by mouth Daily. 90 tablet 1    Premarin 0.625 MG/GM vaginal cream Insert 1 g into the vagina 2 (Two) Times a Week. Sometimes 3 x week 30 g 5    Hydrocortisone, Perianal, (ANUSOL-HC) 2.5 % rectal cream Insert  into the rectum 2 (Two) Times a Day. (Patient taking differently: Insert 1 application  into the rectum 2 (Two) Times a Day As Needed.) 28 g 0    promethazine-phenylephrine 6.25-5 MG/5ML syrup syrup Take 5 mL by mouth Every 4 (Four) Hours As Needed (cough). 118 mL 0     No facility-administered medications prior to visit.       Patient Active Problem List   Diagnosis    Allergic reaction    AR (allergic rhinitis)    Asthma    Primary hypertension    Depressive disorder     Loss of hair    Mixed hyperlipidemia    Primary insomnia    Major depressive disorder with single episode, in full remission       Advance Care Planning:  ACP discussion was held with the patient during this visit. Patient has an advance directive (not in EMR), copy requested.    Identification of Risk Factors:  Risk factors include: Advance Directive Discussion.    Review of Systems   Constitutional:  Negative for chills, fatigue and fever.   HENT:  Negative for congestion, ear pain and sinus pressure.    Respiratory:  Negative for cough, chest tightness, shortness of breath and wheezing.    Cardiovascular:  Negative for chest pain and palpitations.   Gastrointestinal:  Negative for abdominal pain, blood in stool and constipation.   Skin:  Negative for color change.   Allergic/Immunologic: Negative for environmental allergies.   Neurological:  Negative for dizziness, speech difficulty and headaches.   Psychiatric/Behavioral:  Negative for confusion. The patient is not nervous/anxious.        Compared to one year ago, the patient feels her physical health is the same.  Compared to one year ago, the patient feels her mental health is the same.    Objective     Physical Exam  Vitals and nursing note reviewed.   Constitutional:       Appearance: She is well-developed.   HENT:      Head: Normocephalic and atraumatic.      Right Ear: External ear normal.      Left Ear: External ear normal.      Nose: Nose normal.      Mouth/Throat:      Pharynx: No oropharyngeal exudate.   Eyes:      Conjunctiva/sclera: Conjunctivae normal.      Pupils: Pupils are equal, round, and reactive to light.   Neck:      Thyroid: No thyromegaly.      Vascular: No JVD.   Cardiovascular:      Rate and Rhythm: Normal rate and regular rhythm.      Heart sounds: Normal heart sounds. No murmur heard.     No friction rub. No gallop.   Pulmonary:      Effort: Pulmonary effort is normal. No respiratory distress.      Breath sounds: Normal breath  "sounds. No wheezing or rales.   Chest:      Chest wall: No tenderness.   Abdominal:      General: Bowel sounds are normal. There is no distension.      Palpations: Abdomen is soft. There is no mass.      Tenderness: There is no abdominal tenderness. There is no guarding or rebound.      Hernia: No hernia is present.   Musculoskeletal:         General: No tenderness. Normal range of motion.      Cervical back: Normal range of motion and neck supple.   Lymphadenopathy:      Cervical: No cervical adenopathy.   Skin:     General: Skin is warm and dry.      Findings: No erythema or rash.   Neurological:      Mental Status: She is alert and oriented to person, place, and time.      Cranial Nerves: No cranial nerve deficit.      Sensory: No sensory deficit.      Motor: No abnormal muscle tone.      Coordination: Coordination normal.      Deep Tendon Reflexes: Reflexes normal.   Psychiatric:         Behavior: Behavior normal.         Thought Content: Thought content normal.         Judgment: Judgment normal.          Procedures     Vitals:    12/07/23 0842   BP: 110/60   BP Location: Left arm   Patient Position: Sitting   Cuff Size: Adult   Pulse: 68   Temp: 97.8 °F (36.6 °C)   Weight: 65.9 kg (145 lb 3.2 oz)   Height: 158 cm (62.21\")   PainSc: 0-No pain       BMI is >= 25 and <30. (Overweight) The following options were offered after discussion;: exercise counseling/recommendations and nutrition counseling/recommendations      Assessment & Plan   Problem List Items Addressed This Visit          Cardiac and Vasculature    Primary hypertension    Relevant Medications    lisinopril (PRINIVIL,ZESTRIL) 40 MG tablet    amLODIPine (NORVASC) 5 MG tablet    metoprolol succinate XL (Toprol XL) 50 MG 24 hr tablet    Other Relevant Orders    CBC & Differential (Completed)    Microalbumin / Creatinine Urine Ratio - Urine, Clean Catch (Completed)    Mixed hyperlipidemia    Relevant Medications    rosuvastatin (CRESTOR) 10 MG tablet    " Other Relevant Orders    Comprehensive Metabolic Panel (Completed)    Lipid Panel (Completed)    Apolipoprotein B       Mental Health    Major depressive disorder with single episode, in full remission    Relevant Medications    ALPRAZolam (Xanax) 0.5 MG tablet    FLUoxetine (PROzac) 20 MG capsule       Pulmonary and Pneumonias    Asthma    Relevant Medications    albuterol sulfate HFA (ProAir HFA) 108 (90 Base) MCG/ACT inhaler    Fluticasone-Salmeterol (ADVAIR/WIXELA) 100-50 MCG/ACT DISKUS     Other Visit Diagnoses       Preventative health care    -  Primary    Abnormal glucose        Relevant Orders    Hemoglobin A1c (Completed)          1. Prevention  Overall, she remains in good health with good lifestyle habits. She is up to date on mammography, colorectal cancer screening, COVID-19, and influenza vaccines. We discussed RSV. She had a DEXA 3 years ago that was essentially normal, so we will consider repeating this next year.    2. Hypertension  Blood pressure is well controlled on amlodipine, lisinopril, and metoprolol.    3. Hyperlipidemia  Lipid panel is pending. Continue rosuvastatin and healthy diet.    4. Depression  This remains in remission without treatment.    5. Abnormal glucose  Hemoglobin A1c is pending. The treatment remains healthy diet and avoidance of weight gain.    She will follow up in 1 year or as needed.      Patient Self-Management and Personalized Health Advice  The patient has been provided with information about: diet and exercise and preventive services including:   Annual Wellness Visit (AWV).    Outpatient Encounter Medications as of 12/7/2023   Medication Sig Dispense Refill    albuterol sulfate HFA (ProAir HFA) 108 (90 Base) MCG/ACT inhaler Inhale 2 puffs Every 6 (Six) Hours As Needed for Wheezing. 1 inhaler 5    ALPRAZolam (Xanax) 0.5 MG tablet Take 1 tablet twice a day as needed for anxiety 60 tablet 2    amLODIPine (NORVASC) 5 MG tablet TAKE ONE TABLET BY MOUTH DAILY 90 tablet  1    Calcium Carb-Cholecalciferol 1000-800 MG-UNIT tablet Take 1,200 mg by mouth Daily. 1000 U Vitamin D      fexofenadine (ALLEGRA) 180 MG tablet Take 1 tablet by mouth Daily.      FLUoxetine (PROzac) 20 MG capsule TAKE ONE CAPSULE BY MOUTH DAILY 90 capsule 3    Fluticasone-Salmeterol (ADVAIR/WIXELA) 100-50 MCG/ACT DISKUS Inhale 1 puff 2 (Two) Times a Day. 60 each 5    lisinopril (PRINIVIL,ZESTRIL) 40 MG tablet TAKE ONE TABLET BY MOUTH DAILY 90 tablet 3    metoprolol succinate XL (Toprol XL) 50 MG 24 hr tablet Take 1 tablet by mouth Daily. 90 tablet 3    Multiple Vitamins-Minerals (MULTI FOR HER 50+ PO) Take  by mouth.      Premarin 0.625 MG/GM vaginal cream Insert 1 g into the vagina 2 (Two) Times a Week. Sometimes 3 x week 30 g 5    rosuvastatin (CRESTOR) 10 MG tablet Take 1 tablet by mouth Daily. 90 tablet 1    [DISCONTINUED] Premarin 0.625 MG/GM vaginal cream Insert 1 g into the vagina 2 (Two) Times a Week. Sometimes 3 x week 30 g 5    [DISCONTINUED] Hydrocortisone, Perianal, (ANUSOL-HC) 2.5 % rectal cream Insert  into the rectum 2 (Two) Times a Day. (Patient taking differently: Insert 1 application  into the rectum 2 (Two) Times a Day As Needed.) 28 g 0    [DISCONTINUED] promethazine-phenylephrine 6.25-5 MG/5ML syrup syrup Take 5 mL by mouth Every 4 (Four) Hours As Needed (cough). 118 mL 0     No facility-administered encounter medications on file as of 12/7/2023.       Reviewed use of high risk medication in the elderly: yes  Reviewed for potential of harmful drug interactions in the elderly: yes    Follow Up:  Return in about 1 year (around 12/7/2024) for Medicare Wellness.     There are no Patient Instructions on file for this visit.    An After Visit Summary and PPPS with all of these plans were given to the patient.      Transcribed from ambient dictation for Dilip Carrero MD by Lu Troy.  12/07/23   11:18 EST    Patient or patient representative verbalized consent to the visit  recording.  I have personally performed the services described in this document as transcribed by the above individual, and it is both accurate and complete.

## 2023-12-10 LAB — APO B SERPL-MCNC: 64 MG/DL

## 2023-12-28 RX ORDER — AMLODIPINE BESYLATE 5 MG/1
5 TABLET ORAL DAILY
Qty: 90 TABLET | Refills: 1 | Status: SHIPPED | OUTPATIENT
Start: 2023-12-28

## 2023-12-28 RX ORDER — AMLODIPINE BESYLATE 5 MG/1
5 TABLET ORAL DAILY
Qty: 90 TABLET | Refills: 1 | OUTPATIENT
Start: 2023-12-28

## 2023-12-28 NOTE — TELEPHONE ENCOUNTER
Caller: Jose Guadalupe Vasquezry S    Relationship: Self    Best call back number: [phone]     Requested Prescriptions:   Requested Prescriptions     Pending Prescriptions Disp Refills    amLODIPine (NORVASC) 5 MG tablet 90 tablet 1     Sig: Take 1 tablet by mouth Daily.      Pharmacy where request should be sent: [facility] #[id number] [place] [address] [facility] AT [facility] [phone] [facility] [phone]      Last office visit with prescribing clinician: 12/7/2023   Last telemedicine visit with prescribing clinician: Visit date not found   Next office visit with prescribing clinician: 12/12/2024     Additional details provided by patient: PATIENT LEAVING FOR [place]. WILL RUN OUT OF HER MEDICATION BEFORE HER RETURN. ASKING FOR PRESCRIPTION TO BE SENT TO [facility] LISTED ABOVE.    Does the patient have less than a 3 day supply:  [] Yes  [x] No    Would you like a call back once the refill request has been completed: [] Yes [x] No    If the office needs to give you a call back, can they leave a voicemail: [] Yes [x] No    Homar Ayala Rep   12/28/23 10:10 EST

## 2024-01-30 RX ORDER — ROSUVASTATIN CALCIUM 10 MG/1
10 TABLET, COATED ORAL DAILY
Qty: 90 TABLET | Refills: 1 | OUTPATIENT
Start: 2024-01-30

## 2024-01-30 RX ORDER — ROSUVASTATIN CALCIUM 10 MG/1
10 TABLET, COATED ORAL DAILY
Qty: 90 TABLET | Refills: 1 | Status: SHIPPED | OUTPATIENT
Start: 2024-01-30

## 2024-03-26 RX ORDER — AMLODIPINE BESYLATE 5 MG/1
5 TABLET ORAL DAILY
Qty: 90 TABLET | Refills: 1 | Status: SHIPPED | OUTPATIENT
Start: 2024-03-26

## 2024-03-26 NOTE — TELEPHONE ENCOUNTER
Caller: [facility] [id number] - [facility] [address] AT [facility] - [phone] [facility] [phone] FX    Relationship: Pharmacy    Best call back number: [phone]     Requested Prescriptions:   Requested Prescriptions     Pending Prescriptions Disp Refills    amLODIPine (NORVASC) 5 MG tablet 90 tablet 1     Sig: Take 1 tablet by mouth Daily.        Pharmacy where request should be sent: [facility] [id number] [place], [place] - [address] AT [facility] - [phone] [facility] [phone] FX     Last office visit with prescribing clinician: 12/7/2023   Last telemedicine visit with prescribing clinician: Visit date not found   Next office visit with prescribing clinician: 12/12/2024     Additional details provided by patient: PATIENT IS OUT.     Does the patient have less than a 3 day supply:  [x] Yes  [] No    Would you like a call back once the refill request has been completed: [] Yes [x] No    If the office needs to give you a call back, can they leave a voicemail: [] Yes [x] No    Cadance Dunaway, RegSched Rep   03/26/24 11:02 EDT

## 2024-04-12 DIAGNOSIS — F41.9 ANXIETY: ICD-10-CM

## 2024-04-12 RX ORDER — ALPRAZOLAM 0.5 MG/1
TABLET ORAL
Qty: 60 TABLET | Refills: 2 | Status: SHIPPED | OUTPATIENT
Start: 2024-04-12

## 2024-04-12 NOTE — TELEPHONE ENCOUNTER
Rx Refill Note  Requested Prescriptions     Pending Prescriptions Disp Refills    ALPRAZolam (Xanax) 0.5 MG tablet 60 tablet 2     Sig: Take 1 tablet twice a day as needed for anxiety      Last office visit with prescribing clinician: 12/7/2023   Next office visit with prescribing clinician: 12/12/2024     LA: 06/05/23 #60 2R                          Would you like a call back once the refill request has been completed: [] Yes [] No    If the office needs to give you a call back, can they leave a voicemail: [] Yes [] No    Eun Mesa LPN  04/12/24, 09:22 EDT   bilaterally. No retractions or use of accessory muscles. No vocal fremitus. No rhonchi, No crackle No rale. Heart: Regular rate and regular rhythm,    Abdomen: Soft, non-tender; bowel sounds normal; no masses,  no organomegaly  Extremities: Atraumatic, no cyanosis,  edema  Neurologic: Cranial nerves 2-12 grossly intact, no slurred speech. right IJ  Banda     Most Recent Labs  Recent Labs      12/10/18   1210  12/11/18   0600   WBC  13.6*  11.8*   HGB  9.8*  9.8*   HCT  33.1*  32.6*   MCV  87.1  87.6   PLT  276  267     Recent Labs      12/10/18   1210  12/11/18   0600   NA  137  138   K  4.5  4.0   CL  102  103   CO2  28  28   BUN  9  8   CREATININE  0.2*  0.2*   GFRAA  >60  >60   LABGLOM  >60  >60   GLUCOSE  115*  94   CALCIUM  6.7*  6.1*     ESRNo results for input(s): SEDRATE in the last 72 hours. Lab Results   Component Value Date    WBC 11.8 (H) 12/11/2018    HGB 9.8 (L) 12/11/2018    HCT 32.6 (L) 12/11/2018     12/11/2018     12/11/2018    K 4.0 12/11/2018     12/11/2018    CREATININE 0.2 (L) 12/11/2018    BUN 8 12/11/2018    CO2 28 12/11/2018    GLUCOSE 94 12/11/2018    ALT 6 12/08/2018    AST 11 12/08/2018    INR 1.0 05/29/2018       Xr Chest Portable    Result Date: 12/7/2018  Reading location: [facility] Indication: Line placement. History of lung cancer. Comparison: Chest radiograph from chest radiograph from 12/7/2018 at 1431 hours. Technique: Portable upright chest radiograph was obtained. Findings: A right internal jugular catheter tip projects within the superior vena cava. There is no pneumothorax. The cardiomediastinal silhouette is stable in size and contours. There are stable masslike consolidation within the right midlung. There is diffuse airspace disease throughout the left lung with suggestion of small bilateral pleural effusions There is no evidence of pneumothorax. 1. Right internal jugular catheter and a permanent position. No pneumothorax.  2. Stable masslike consolidation within the right midlung with diffuse left lung airspace disease and small bilateral pleural effusions. Xr Chest Portable    Result Date: 12/7/2018  LOCATION: 200 EXAM: XR CHEST PORTABLE COMPARISON: None HISTORY: Shortness of breath TECHNIQUE: Single frontal view of the chest was obtained. FINDINGS:  SUPPORT DEVICES: None. LUNGS: Widespread airspace disease is identified greater on the left with bilateral effusions. A more rounded area of consolidation in the right midlung zone measures 6 cm. PLEURA: No effusions or pneumothorax. LUNG VOLUMES: Satisfactory inspirator effort. MEDIASTINAL STRUCTURES: No lymphadenopathy. Normal aortic contour. HEART SIZE: Normal. BONES AND SOFT TISSUES: No fracture or soft tissue abnormality. Bilateral airspace disease and effusions. Findings likely infectious. Follow-up should be obtained to confirm resolution. Cta Chest W Contrast    Result Date: 12/7/2018  Reading Location: 200 Indication: Shortness of breath, history of lung cancer with osseous metastases Comparison: CT thoracic spine from 9/8/2018. Technique: Multidetector CT angiography of the chest was preformed after the administration of intravenous contrast (80 mL of [id number]-[id number]). Coronal, sagittal, and MIP reformatted images were obtained. Automated dose exposure control was used for this exam. FINDINGS: There is adequate opacification of the pulmonary arteries to the subsegmental level. There is chronic appearing peripheral thrombus within a segmental branch of the right upper lobe (series 3, image 60). There is no CT evidence of right heart strain. The main pulmonary artery is normal in caliber. The central airways are patent. There is no bronchiectasis. There are moderate right and trace left pleural effusions with right lower lobe consolidation. There is suggestion of a masslike consolidation within the right upper lobe (series 5, image 64), which measures approximately 42 mm x 31 mm.  There

## 2024-06-21 RX ORDER — FLUTICASONE PROPIONATE AND SALMETEROL 100; 50 UG/1; UG/1
1 POWDER RESPIRATORY (INHALATION) 2 TIMES DAILY
Qty: 1 EACH | Refills: 3 | Status: SHIPPED | OUTPATIENT
Start: 2024-06-21

## 2024-06-25 ENCOUNTER — TRANSCRIBE ORDERS (OUTPATIENT)
Dept: ADMINISTRATIVE | Facility: HOSPITAL | Age: 69
End: 2024-06-25
Payer: MEDICARE

## 2024-06-25 DIAGNOSIS — Z12.31 BREAST CANCER SCREENING BY MAMMOGRAM: Primary | ICD-10-CM

## 2024-06-27 RX ORDER — LISINOPRIL 40 MG/1
40 TABLET ORAL DAILY
Qty: 90 TABLET | Refills: 3 | Status: SHIPPED | OUTPATIENT
Start: 2024-06-27

## 2024-07-30 RX ORDER — METOPROLOL SUCCINATE 50 MG/1
50 TABLET, EXTENDED RELEASE ORAL DAILY
Qty: 90 TABLET | Refills: 3 | OUTPATIENT
Start: 2024-07-30

## 2024-07-30 RX ORDER — METOPROLOL SUCCINATE 50 MG/1
50 TABLET, EXTENDED RELEASE ORAL DAILY
Qty: 90 TABLET | Refills: 3 | Status: SHIPPED | OUTPATIENT
Start: 2024-07-30

## 2024-07-30 RX ORDER — ROSUVASTATIN CALCIUM 10 MG/1
10 TABLET, COATED ORAL DAILY
Qty: 90 TABLET | Refills: 1 | Status: SHIPPED | OUTPATIENT
Start: 2024-07-30

## 2024-07-30 RX ORDER — ROSUVASTATIN CALCIUM 10 MG/1
10 TABLET, COATED ORAL DAILY
Qty: 90 TABLET | Refills: 1 | OUTPATIENT
Start: 2024-07-30

## 2024-07-30 NOTE — TELEPHONE ENCOUNTER
Rx Refill Note  Requested Prescriptions     Pending Prescriptions Disp Refills    rosuvastatin (CRESTOR) 10 MG tablet [Pharmacy Med Name: ROSUVASTATIN CALCIUM 10 MG TAB] 90 tablet 1     Sig: TAKE 1 TABLET BY MOUTH DAILY    metoprolol succinate XL (TOPROL-XL) 50 MG 24 hr tablet [Pharmacy Med Name: METOPROLOL SUCC ER 50 MG TAB] 90 tablet 3     Sig: TAKE 1 TABLET BY MOUTH DAILY      Last office visit with prescribing clinician: 12/7/2023   Last telemedicine visit with prescribing clinician: Visit date not found   Next office visit with prescribing clinician: 12/12/2024                         Would you like a call back once the refill request has been completed: [] Yes [] No    If the office needs to give you a call back, can they leave a voicemail: [] Yes [] No    Brielle Goetz MA  07/30/24, 10:49 EDT

## 2024-08-26 LAB
NCCN CRITERIA FLAG: NORMAL
TYRER CUZICK SCORE: 5

## 2024-09-09 ENCOUNTER — HOSPITAL ENCOUNTER (OUTPATIENT)
Dept: MAMMOGRAPHY | Facility: HOSPITAL | Age: 69
Discharge: HOME | End: 2024-09-09
Admitting: OBSTETRICS & GYNECOLOGY
Payer: MEDICARE

## 2024-09-09 DIAGNOSIS — Z12.31 BREAST CANCER SCREENING BY MAMMOGRAM: ICD-10-CM

## 2024-09-09 PROCEDURE — 77063 BREAST TOMOSYNTHESIS BI: CPT

## 2024-09-09 PROCEDURE — 77067 SCR MAMMO BI INCL CAD: CPT

## 2024-10-01 RX ORDER — AMLODIPINE BESYLATE 5 MG/1
5 TABLET ORAL DAILY
Qty: 90 TABLET | Refills: 1 | Status: SHIPPED | OUTPATIENT
Start: 2024-10-01

## 2024-10-01 NOTE — TELEPHONE ENCOUNTER
Rx Refill Note  Requested Prescriptions     Pending Prescriptions Disp Refills    amLODIPine (NORVASC) 5 MG tablet [Pharmacy Med Name: AMLODIPINE BESYLATE 5 MG TAB] 90 tablet 1     Sig: TAKE 1 TABLET BY MOUTH DAILY      Last office visit with prescribing clinician: 12/7/2023   Last telemedicine visit with prescribing clinician: Visit date not found   Next office visit with prescribing clinician: 12/12/2024                         Would you like a call back once the refill request has been completed: [] Yes [] No    If the office needs to give you a call back, can they leave a voicemail: [] Yes [] No    Yojana Patterson LPN  10/01/24, 09:21 EDT

## 2024-10-01 NOTE — TELEPHONE ENCOUNTER
Rx Refill Note  Requested Prescriptions     Pending Prescriptions Disp Refills    amLODIPine (NORVASC) 5 MG tablet [Pharmacy Med Name: AMLODIPINE BESYLATE 5 MG TAB] 90 tablet 1     Sig: Take 1 tablet by mouth Daily.      Last office visit with prescribing clinician: 12/7/2023   Last telemedicine visit with prescribing clinician: Visit date not found   Next office visit with prescribing clinician: 12/12/2024                         Would you like a call back once the refill request has been completed: [] Yes [] No    If the office needs to give you a call back, can they leave a voicemail: [] Yes [] No    Eun Mesa LPN  10/01/24, 09:21 EDT

## 2025-01-17 RX ORDER — ROSUVASTATIN CALCIUM 10 MG/1
10 TABLET, COATED ORAL DAILY
Qty: 90 TABLET | Refills: 1 | Status: SHIPPED | OUTPATIENT
Start: 2025-01-17

## 2025-01-28 ENCOUNTER — TELEPHONE (OUTPATIENT)
Dept: INTERNAL MEDICINE | Facility: CLINIC | Age: 70
End: 2025-01-28
Payer: MEDICARE

## 2025-01-28 RX ORDER — ALBUTEROL SULFATE 90 UG/1
2 INHALANT RESPIRATORY (INHALATION) EVERY 6 HOURS PRN
Qty: 18 G | Refills: 1 | Status: SHIPPED | OUTPATIENT
Start: 2025-01-28 | End: 2025-01-28 | Stop reason: SDUPTHER

## 2025-01-28 RX ORDER — ALBUTEROL SULFATE 90 UG/1
2 INHALANT RESPIRATORY (INHALATION) EVERY 6 HOURS PRN
Qty: 18 G | Refills: 1 | Status: SHIPPED | OUTPATIENT
Start: 2025-01-28

## 2025-01-28 RX ORDER — AZITHROMYCIN 250 MG/1
TABLET, FILM COATED ORAL
Qty: 6 TABLET | Refills: 0 | Status: SHIPPED | OUTPATIENT
Start: 2025-01-28

## 2025-01-28 NOTE — TELEPHONE ENCOUNTER
Spoke with pt and she is not able to come in because she is in [place], [place]. She sent a [employer] message as well and I forwarded that to Dr. Carrero.

## 2025-01-28 NOTE — TELEPHONE ENCOUNTER
Caller: Xochilt Vasquez    Relationship: Self    Best call back number:   Telephone Information:   Mobile [phone]        What medication are you requesting: Z-PACK OR ANTIBIOTIC    What are your current symptoms: COUGH, RUNNY NOSE, CONGESTION, SNEEZING    How long have you been experiencing symptoms: 3 DAYS    Have you had these symptoms before:    [x] Yes  [] No    Have you been treated for these symptoms before:   [x] Yes  [] No    If a prescription is needed, what is your preferred pharmacy and phone number: [id number] [place], [place] - [address] AT [facility] - [phone] [facility] [phone] FX     Additional notes:

## 2025-02-20 ENCOUNTER — OFFICE VISIT (OUTPATIENT)
Dept: INTERNAL MEDICINE | Facility: CLINIC | Age: 70
End: 2025-02-20
Payer: MEDICARE

## 2025-02-20 ENCOUNTER — LAB (OUTPATIENT)
Dept: LAB | Facility: HOSPITAL | Age: 70
End: 2025-02-20
Payer: MEDICARE

## 2025-02-20 VITALS
DIASTOLIC BLOOD PRESSURE: 76 MMHG | OXYGEN SATURATION: 97 % | BODY MASS INDEX: 34.67 KG/M2 | HEIGHT: 55 IN | WEIGHT: 149.8 LBS | HEART RATE: 71 BPM | TEMPERATURE: 97.8 F | SYSTOLIC BLOOD PRESSURE: 128 MMHG

## 2025-02-20 DIAGNOSIS — I10 PRIMARY HYPERTENSION: ICD-10-CM

## 2025-02-20 DIAGNOSIS — E78.2 MIXED HYPERLIPIDEMIA: ICD-10-CM

## 2025-02-20 DIAGNOSIS — F41.9 ANXIETY: ICD-10-CM

## 2025-02-20 DIAGNOSIS — R73.09 ABNORMAL GLUCOSE: ICD-10-CM

## 2025-02-20 DIAGNOSIS — F32.5 MAJOR DEPRESSIVE DISORDER WITH SINGLE EPISODE, IN FULL REMISSION: ICD-10-CM

## 2025-02-20 DIAGNOSIS — J45.20 MILD INTERMITTENT ASTHMA WITHOUT COMPLICATION: ICD-10-CM

## 2025-02-20 DIAGNOSIS — Z12.11 COLON CANCER SCREENING: ICD-10-CM

## 2025-02-20 DIAGNOSIS — Z78.0 MENOPAUSE: ICD-10-CM

## 2025-02-20 DIAGNOSIS — Z00.00 MEDICARE ANNUAL WELLNESS VISIT, SUBSEQUENT: Primary | ICD-10-CM

## 2025-02-20 LAB
ALBUMIN SERPL-MCNC: 4.6 G/DL (ref 3.5–5.2)
ALBUMIN/GLOB SERPL: 1.5 G/DL
ALP SERPL-CCNC: 85 U/L (ref 39–117)
ALT SERPL W P-5'-P-CCNC: 28 U/L (ref 1–33)
ANION GAP SERPL CALCULATED.3IONS-SCNC: 11.8 MMOL/L (ref 5–15)
AST SERPL-CCNC: 33 U/L (ref 1–32)
BASOPHILS # BLD AUTO: 0.06 10*3/MM3 (ref 0–0.2)
BASOPHILS NFR BLD AUTO: 1.6 % (ref 0–1.5)
BILIRUB SERPL-MCNC: 1.1 MG/DL (ref 0–1.2)
BUN SERPL-MCNC: 13 MG/DL (ref 8–23)
BUN/CREAT SERPL: 16.5 (ref 7–25)
CALCIUM SPEC-SCNC: 10.1 MG/DL (ref 8.6–10.5)
CHLORIDE SERPL-SCNC: 100 MMOL/L (ref 98–107)
CHOLEST SERPL-MCNC: 163 MG/DL (ref 0–200)
CO2 SERPL-SCNC: 27.2 MMOL/L (ref 22–29)
CREAT SERPL-MCNC: 0.79 MG/DL (ref 0.57–1)
DEPRECATED RDW RBC AUTO: 45.3 FL (ref 37–54)
EGFRCR SERPLBLD CKD-EPI 2021: 81.1 ML/MIN/1.73
EOSINOPHIL # BLD AUTO: 0.15 10*3/MM3 (ref 0–0.4)
EOSINOPHIL NFR BLD AUTO: 4 % (ref 0.3–6.2)
ERYTHROCYTE [DISTWIDTH] IN BLOOD BY AUTOMATED COUNT: 12.8 % (ref 12.3–15.4)
GLOBULIN UR ELPH-MCNC: 3 GM/DL
GLUCOSE SERPL-MCNC: 111 MG/DL (ref 65–99)
HBA1C MFR BLD: 5.5 % (ref 4.8–5.6)
HCT VFR BLD AUTO: 39.5 % (ref 34–46.6)
HDLC SERPL-MCNC: 69 MG/DL (ref 40–60)
HGB BLD-MCNC: 13.6 G/DL (ref 12–15.9)
IMM GRANULOCYTES # BLD AUTO: 0 10*3/MM3 (ref 0–0.05)
IMM GRANULOCYTES NFR BLD AUTO: 0 % (ref 0–0.5)
LDLC SERPL CALC-MCNC: 75 MG/DL (ref 0–100)
LDLC/HDLC SERPL: 1.05 {RATIO}
LYMPHOCYTES # BLD AUTO: 1.5 10*3/MM3 (ref 0.7–3.1)
LYMPHOCYTES NFR BLD AUTO: 39.6 % (ref 19.6–45.3)
MCH RBC QN AUTO: 33.2 PG (ref 26.6–33)
MCHC RBC AUTO-ENTMCNC: 34.4 G/DL (ref 31.5–35.7)
MCV RBC AUTO: 96.3 FL (ref 79–97)
MONOCYTES # BLD AUTO: 0.38 10*3/MM3 (ref 0.1–0.9)
MONOCYTES NFR BLD AUTO: 10 % (ref 5–12)
NEUTROPHILS NFR BLD AUTO: 1.7 10*3/MM3 (ref 1.7–7)
NEUTROPHILS NFR BLD AUTO: 44.8 % (ref 42.7–76)
NRBC BLD AUTO-RTO: 0 /100 WBC (ref 0–0.2)
PLATELET # BLD AUTO: 295 10*3/MM3 (ref 140–450)
PMV BLD AUTO: 9.7 FL (ref 6–12)
POTASSIUM SERPL-SCNC: 3.7 MMOL/L (ref 3.5–5.2)
PROT SERPL-MCNC: 7.6 G/DL (ref 6–8.5)
RBC # BLD AUTO: 4.1 10*6/MM3 (ref 3.77–5.28)
SODIUM SERPL-SCNC: 139 MMOL/L (ref 136–145)
TRIGL SERPL-MCNC: 107 MG/DL (ref 0–150)
VLDLC SERPL-MCNC: 19 MG/DL (ref 5–40)
WBC NRBC COR # BLD AUTO: 3.79 10*3/MM3 (ref 3.4–10.8)

## 2025-02-20 PROCEDURE — 82570 ASSAY OF URINE CREATININE: CPT

## 2025-02-20 PROCEDURE — 85025 COMPLETE CBC W/AUTO DIFF WBC: CPT

## 2025-02-20 PROCEDURE — 80053 COMPREHEN METABOLIC PANEL: CPT

## 2025-02-20 PROCEDURE — 82043 UR ALBUMIN QUANTITATIVE: CPT

## 2025-02-20 PROCEDURE — 83036 HEMOGLOBIN GLYCOSYLATED A1C: CPT

## 2025-02-20 PROCEDURE — 80061 LIPID PANEL: CPT

## 2025-02-20 RX ORDER — FLUTICASONE PROPIONATE AND SALMETEROL 100; 50 UG/1; UG/1
1 POWDER RESPIRATORY (INHALATION) 2 TIMES DAILY
Qty: 1 EACH | Refills: 3 | Status: SHIPPED | OUTPATIENT
Start: 2025-02-20

## 2025-02-20 RX ORDER — CONJUGATED ESTROGENS 0.62 MG/G
1 CREAM VAGINAL 2 TIMES WEEKLY
Qty: 30 G | Refills: 5 | Status: SHIPPED | OUTPATIENT
Start: 2025-02-20

## 2025-02-20 RX ORDER — TRAZODONE HYDROCHLORIDE 50 MG/1
50 TABLET, FILM COATED ORAL NIGHTLY
Qty: 30 TABLET | Refills: 5 | Status: SHIPPED | OUTPATIENT
Start: 2025-02-20

## 2025-02-20 RX ORDER — ALPRAZOLAM 0.5 MG
TABLET ORAL
Qty: 60 TABLET | Refills: 2 | Status: SHIPPED | OUTPATIENT
Start: 2025-02-20

## 2025-02-21 LAB
ALBUMIN UR-MCNC: <1.2 MG/DL
CREAT UR-MCNC: 262.6 MG/DL
MICROALBUMIN/CREAT UR: NORMAL MG/G{CREAT}

## 2025-02-22 ENCOUNTER — TRANSCRIBE ORDERS (OUTPATIENT)
Dept: INTERNAL MEDICINE | Facility: CLINIC | Age: 70
End: 2025-02-22
Payer: MEDICARE

## 2025-02-22 DIAGNOSIS — Z12.31 ENCOUNTER FOR SCREENING MAMMOGRAM FOR BREAST CANCER: Primary | ICD-10-CM

## 2025-03-03 ENCOUNTER — OFFICE VISIT (OUTPATIENT)
Dept: INTERNAL MEDICINE | Facility: CLINIC | Age: 70
End: 2025-03-03
Payer: MEDICARE

## 2025-03-03 ENCOUNTER — TELEPHONE (OUTPATIENT)
Dept: INTERNAL MEDICINE | Facility: CLINIC | Age: 70
End: 2025-03-03
Payer: MEDICARE

## 2025-03-03 VITALS
HEIGHT: 62 IN | BODY MASS INDEX: 27.42 KG/M2 | DIASTOLIC BLOOD PRESSURE: 80 MMHG | WEIGHT: 149 LBS | SYSTOLIC BLOOD PRESSURE: 118 MMHG | HEART RATE: 76 BPM

## 2025-03-03 DIAGNOSIS — K92.1: Primary | ICD-10-CM

## 2025-03-03 PROCEDURE — 1160F RVW MEDS BY RX/DR IN RCRD: CPT

## 2025-03-03 PROCEDURE — 3074F SYST BP LT 130 MM HG: CPT

## 2025-03-03 PROCEDURE — 99214 OFFICE O/P EST MOD 30 MIN: CPT

## 2025-03-03 PROCEDURE — 1125F AMNT PAIN NOTED PAIN PRSNT: CPT

## 2025-03-03 PROCEDURE — 3079F DIAST BP 80-89 MM HG: CPT

## 2025-03-03 PROCEDURE — 1159F MED LIST DOCD IN RCRD: CPT

## 2025-03-03 RX ORDER — HYDROCORTISONE ACETATE 25 MG/1
25 SUPPOSITORY RECTAL 2 TIMES DAILY
Qty: 14 SUPPOSITORY | Refills: 0 | Status: SHIPPED | OUTPATIENT
Start: 2025-03-03 | End: 2025-03-10

## 2025-03-03 RX ORDER — VALACYCLOVIR HYDROCHLORIDE 500 MG/1
500 TABLET, FILM COATED ORAL DAILY
COMMUNITY
Start: 2025-02-20

## 2025-03-03 NOTE — PROGRESS NOTES
Acute Office Visit      Name: Xochilt Vasquez    : 1955     MRN: [id number]   Care Team: Patient Care Team:  Dilip Carrero MD as PCP - General  Dilip Carrero MD as PCP - Family Medicine    Chief Complaint  Rectal Bleeding (Started this morning ) and Abdominal Pain    Subjective     History of Present Illness:    Xochilt is a pleasant 69-year-old female who comes to the office today with rectal bleeding that started today around lunchtime.    She reports one episode of rectal bleeding around lunchtime today where she had a bowel movement and noted the blood on the toilet paper upon wiping.  She then looked in the toilet and noticed bright red blood.  A couple hours later, she felt they are urged to have a bowel movement again, she went to the bathroom and did not have a bowel movement but noted more bright red blood.  She states this has happened once in the past, in  after an episode of diverticulitis.    Xochilt denies a history of constipation or straining during defecation.  She states that she normally experiences diarrhea.    She denies other symptoms at this time and states that she has a colonoscopy scheduled on .    Past Medical History:   Diagnosis Date    Allergic child    Anxiety years ago    Arthritis years ago    Cataract Removed Oct 2022    Clotting disorder     Colon polyp Last colonosscopy    Diverticulosis a few years ago    Headache long time    Hyperlipidemia a few years    Hypertension long time       Past Surgical History:   Procedure Laterality Date    AUGMENTATION MAMMAPLASTY Bilateral     BREAST EXCISIONAL BIOPSY Right     COLONOSCOPY  a few yrs ago    COSMETIC SURGERY  long time ago    EYE SURGERY  Oct  2022       Social History     Socioeconomic History    Marital status:    Tobacco Use    Smoking status: Former     Current packs/day: 0.00     Average packs/day: 0.3 packs/day for 20.0 years (5.0 ttl pk-yrs)     Types: Cigarettes      Start date: 10/1/1991     Quit date: 10/1/2011     Years since quittin.4    Smokeless tobacco: Never    Tobacco comments:     0.1 PPD for 20 years Quit 2012   Vaping Use    Vaping status: Never Used   Substance and Sexual Activity    Alcohol use: Yes     Comment: 8 wine or mixed drink    Drug use: Never    Sexual activity: Not Currently         Current Outpatient Medications:     albuterol sulfate HFA (ProAir HFA) 108 (90 Base) MCG/ACT inhaler, Inhale 2 puffs Every 6 (Six) Hours As Needed for Wheezing., Disp: 18 g, Rfl: 1    ALPRAZolam (Xanax) 0.5 MG tablet, Take 1 tablet twice a day as needed for anxiety, Disp: 60 tablet, Rfl: 2    amLODIPine (NORVASC) 5 MG tablet, TAKE 1 TABLET BY MOUTH DAILY, Disp: 90 tablet, Rfl: 1    Calcium Carb-Cholecalciferol 1000-800 MG-UNIT tablet, Take 1,200 mg by mouth Daily. 1000 U Vitamin D, Disp: , Rfl:     fexofenadine (ALLEGRA) 180 MG tablet, Take 1 tablet by mouth Daily., Disp: , Rfl:     FLUoxetine (PROzac) 20 MG capsule, TAKE 1 CAPSULE BY MOUTH DAILY, Disp: 90 capsule, Rfl: 3    Fluticasone-Salmeterol (ADVAIR/WIXELA) 100-50 MCG/ACT DISKUS, Inhale 1 puff 2 (Two) Times a Day., Disp: 1 each, Rfl: 3    lisinopril (PRINIVIL,ZESTRIL) 40 MG tablet, Take 1 tablet by mouth Daily., Disp: 90 tablet, Rfl: 3    metoprolol succinate XL (Toprol XL) 50 MG 24 hr tablet, Take 1 tablet by mouth Daily., Disp: 90 tablet, Rfl: 3    Multiple Vitamins-Minerals (MULTI FOR HER 50+ PO), Take  by mouth., Disp: , Rfl:     Premarin 0.625 MG/GM vaginal cream, Insert 1 g into the vagina 2 (Two) Times a Week. Sometimes 3 x week, Disp: 30 g, Rfl: 5    rosuvastatin (CRESTOR) 10 MG tablet, TAKE 1 TABLET BY MOUTH DAILY, Disp: 90 tablet, Rfl: 1    traZODone (DESYREL) 50 MG tablet, Take 1 tablet by mouth Every Night., Disp: 30 tablet, Rfl: 5    valACYclovir (VALTREX) 500 MG tablet, Take 1 tablet by mouth Daily., Disp: , Rfl:     hydrocortisone (ANUSOL-HC) 25 MG suppository, Insert 1 suppository into the  "rectum 2 (Two) Times a Day for 7 days., Disp: 14 suppository, Rfl: 0    Procedures    PHQ-9 Total Score:      Objective     Vital Signs  /80 (BP Location: Left arm, Patient Position: Sitting, Cuff Size: Adult)   Pulse 76   Ht 156.2 cm (61.5\")   Wt 67.6 kg (149 lb)   BMI 27.70 kg/m²   Estimated body mass index is 27.7 kg/m² as calculated from the following:    Height as of this encounter: 156.2 cm (61.5\").    Weight as of this encounter: 67.6 kg (149 lb).    Physical Exam  Vitals and nursing note reviewed.   HENT:      Head: Normocephalic.   Skin:     General: Skin is warm and dry.   Neurological:      General: No focal deficit present.      Mental Status: She is alert and oriented to person, place, and time.   Psychiatric:         Mood and Affect: Mood normal.         Behavior: Behavior normal.         Thought Content: Thought content normal.         Judgment: Judgment normal.        Assessment and Plan     Assessment/Plan:  Diagnoses and all orders for this visit:    1. Blood in stool, manolo (Primary)  -     hydrocortisone (ANUSOL-HC) 25 MG suppository; Insert 1 suppository into the rectum 2 (Two) Times a Day for 7 days.  Dispense: 14 suppository; Refill: 0  -Start Anusol supp twice daily x 7 days.  -Continue to colonoscopy in April.  -If symptoms continue, return to office.     There are no Patient Instructions on file for this visit.  Plan of care reviewed with patient at the conclusion of today's visit. Education was provided regarding diagnosis, management and any prescribed or recommended OTC medications.  Patient verbalizes understanding of and agreement with management plan.    Follow Up  Return for Next Scheduled Follow up.    CADE Rosado   "

## 2025-03-03 NOTE — TELEPHONE ENCOUNTER
Patient called in to say that she noticed  at around 40 mins when she went to have a BM she wiped and noticed bright red blood on the toilet paper. When she got up to have a look in the toilet she noticed the water was dark red. Said she waited 20 mins and went back to try and have another BM and noticed a lot of blood again in the toilet. Said she was told some time ago by Dr Carrero that she may have diverticulitis. Says this past weekend she felt very bloated and had gas cramps.  She denies having any pain right now. Says she has a colonoscopy scheduled for 04/29/25.   I advised her to please go to the ER but patient says she does not want to go to ER or urgent care . Advised her that Dr Carrero is not in office on Mondays but he will be in tomorrow. Offered her a 3:30 appt this afternoon with Dr Burnham . Patient understood and agreed to the appt.

## 2025-03-06 RX ORDER — DICYCLOMINE HYDROCHLORIDE 10 MG/1
10 CAPSULE ORAL
Qty: 30 CAPSULE | Refills: 0 | Status: SHIPPED | OUTPATIENT
Start: 2025-03-06

## 2025-03-24 RX ORDER — AMLODIPINE BESYLATE 5 MG/1
5 TABLET ORAL DAILY
Qty: 90 TABLET | Refills: 1 | OUTPATIENT
Start: 2025-03-24

## 2025-03-24 RX ORDER — AMLODIPINE BESYLATE 5 MG/1
5 TABLET ORAL DAILY
Qty: 90 TABLET | Refills: 1 | Status: SHIPPED | OUTPATIENT
Start: 2025-03-24

## 2025-04-15 RX ORDER — SODIUM, POTASSIUM,MAG SULFATES 17.5-3.13G
SOLUTION, RECONSTITUTED, ORAL ORAL
Qty: 354 ML | Refills: 0 | Status: SHIPPED | OUTPATIENT
Start: 2025-04-15

## 2025-04-29 ENCOUNTER — OUTSIDE FACILITY SERVICE (OUTPATIENT)
Dept: GASTROENTEROLOGY | Facility: CLINIC | Age: 70
End: 2025-04-29
Payer: MEDICARE

## 2025-04-29 PROCEDURE — 88305 TISSUE EXAM BY PATHOLOGIST: CPT | Performed by: INTERNAL MEDICINE

## 2025-04-30 ENCOUNTER — LAB REQUISITION (OUTPATIENT)
Dept: LAB | Facility: HOSPITAL | Age: 70
End: 2025-04-30
Payer: MEDICARE

## 2025-04-30 DIAGNOSIS — Z86.0100 PERSONAL HISTORY OF COLON POLYPS, UNSPECIFIED: ICD-10-CM

## 2025-04-30 DIAGNOSIS — D12.0 BENIGN NEOPLASM OF CECUM: ICD-10-CM

## 2025-04-30 DIAGNOSIS — K57.30 DIVERTICULOSIS OF LARGE INTESTINE WITHOUT PERFORATION OR ABSCESS WITHOUT BLEEDING: ICD-10-CM

## 2025-04-30 DIAGNOSIS — Z12.11 ENCOUNTER FOR SCREENING FOR MALIGNANT NEOPLASM OF COLON: ICD-10-CM

## 2025-05-18 ENCOUNTER — READMISSION MANAGEMENT (OUTPATIENT)
Dept: CALL CENTER | Facility: HOSPITAL | Age: 70
End: 2025-05-18
Payer: MEDICARE

## 2025-05-18 NOTE — OUTREACH NOTE
Prep Survey      Flowsheet Row Responses   [facility] facility patient discharged from? Non-BH   Is LACE score < 7 ? Non-BH Discharge   Eligibility [facility]   Date of Admission 05/16/25   Date of Discharge 05/17/25   Discharge Disposition Home or Self Care   Discharge diagnosis Memory loss   Does the patient have one of the following disease processes/diagnoses(primary or secondary)? Other   Does the patient have Home health ordered? No   Prep survey completed? Yes            Nessa Rivas Registered Nurse

## 2025-05-19 ENCOUNTER — TRANSITIONAL CARE MANAGEMENT TELEPHONE ENCOUNTER (OUTPATIENT)
Dept: CALL CENTER | Facility: HOSPITAL | Age: 70
End: 2025-05-19
Payer: MEDICARE

## 2025-05-19 NOTE — OUTREACH NOTE
Call Center TCM Note      Flowsheet Row Responses   [facility] patient discharged from? Non-BH   Does the patient have one of the following disease processes/diagnoses(primary or secondary)? Other   TCM attempt successful? Yes   Call start time 0854   Call end time 0912   Discharge diagnosis Memory loss   Person spoke with today (if not patient) and relationship pt   Meds reviewed with patient/caregiver? Yes   Is the patient having any side effects they believe may be caused by any medication additions or changes? No   Does the patient have all medications ordered at discharge? Yes   Is the patient taking all medications as directed (includes completed medication regime)? Yes   Does the patient have an appointment with their PCP within 7-14 days of discharge? Yes  [5/27/2025  2:00 PM]   Did the patient receive a copy of their discharge instructions? Yes   Nursing interventions Reviewed instructions with patient   What is the patient's perception of their health status since discharge? Improving   Is the patient/caregiver able to teach back signs and symptoms related to disease process for when to call PCP? Yes   Is the patient/caregiver able to teach back signs and symptoms related to disease process for when to call 911? Yes   Is the patient/caregiver able to teach back the hierarchy of who to call/visit for symptoms/problems? PCP, Specialist, Home health nurse, Urgent Care, ED, 911 Yes   If the patient is a current smoker, are they able to teach back resources for cessation? Not a smoker   TCM call completed? Yes   Wrap up additional comments Pt states she is doing better, and not having any memory loss episodes. No new medications. Pt verified PCP fu appt. Pt in [place] at time of call.   Call end time 0912   Would this patient benefit from a Referral to [facility] Social Work? No   Is the patient interested in additional calls from an ambulatory ? No            Abbey DASH - Registered Nurse    5/19/2025,  09:13 EDT

## 2025-05-27 ENCOUNTER — OFFICE VISIT (OUTPATIENT)
Dept: INTERNAL MEDICINE | Facility: CLINIC | Age: 70
End: 2025-05-27
Payer: MEDICARE

## 2025-05-27 VITALS
BODY MASS INDEX: 27.38 KG/M2 | HEIGHT: 61 IN | WEIGHT: 145 LBS | DIASTOLIC BLOOD PRESSURE: 76 MMHG | TEMPERATURE: 98 F | SYSTOLIC BLOOD PRESSURE: 136 MMHG | HEART RATE: 75 BPM | OXYGEN SATURATION: 98 %

## 2025-05-27 DIAGNOSIS — G45.4 TRANSIENT GLOBAL AMNESIA: Primary | ICD-10-CM

## 2025-05-27 NOTE — PROGRESS NOTES
Transitional Care Follow Up Visit  Subjective     Xochilt Vasquez is a 69 y.o. female who presents for a transitional care management visit.    Within 48 business hours after discharge our office contacted her via telephone to coordinate her care and needs.      I reviewed and discussed the details of that call along with the discharge summary, hospital problems, inpatient lab results, inpatient diagnostic studies, and consultation reports with Xochilt.     Current outpatient and discharge medications have been reconciled for the patient.  Reviewed by: Dilip Carrero MD          5/18/2025     3:21 PM   Date of TCM Phone Call   [facility]   Date of Admission 5/16/2025   Date of Discharge 5/17/2025   Discharge Disposition Home or Self Care     Risk for Readmission (LACE) No data recorded    History of Present Illness  The patient presents for a transitional care management visit following her hospitalization in [place], [place] on 05/16/2025, where she was discharged the next morning after evaluation for transient global amnesia.    She reports experiencing a traumatic event that led to her hospitalization. She recalls conversing with a friend who was traveling to her location but has no memory of the events that followed. Prior to the incident, she had showered and was preparing to make coffee but does not remember if she completed these tasks. She reports no signs of stroke or seizure. Her friend took her to the hospital, where she had a virtual consultation with a neurologist and underwent extensive blood work. Her blood pressure was elevated during this time. At the stroke facility, she had a head scan and additional blood work. She disclosed to the medical team that she had used a vape pen a few times and consumed alcohol the previous night. The vape pen use was detected in her urine. She continues to struggle with sleep, although trazodone provides intermittent relief. One of the doctors  "suggested that the vape pen could have triggered her symptoms. She was diagnosed with transient global amnesia (TGA). The ER doctor recommended further testing, including an MRI, but she declined as she felt well at the time. She was given trazodone to aid sleep during her hospital stay and reported good sleep quality. She did not undergo an MRI during her hospital stay. She reports fluctuating blood pressure readings.    SOCIAL HISTORY  She admitted to using cannabis a couple of times and having a few drinks.     Course During Hospital Stay: See above     The following portions of the patient's history were reviewed and updated as appropriate: allergies, current medications, past family history, past medical history, past social history, past surgical history, and problem list.    Vitals:    05/27/25 1400   BP: 136/76   BP Location: Left arm   Patient Position: Sitting   Cuff Size: Adult   Pulse: 75   Temp: 98 °F (36.7 °C)   TempSrc: Infrared   SpO2: 98%   Weight: 65.8 kg (145 lb)   Height: 156.2 cm (61.5\")   PainSc: 0-No pain     Body mass index is 26.96 kg/m².        Advance Care Planning   ACP discussion was held with the patient during this visit. Patient has an advance directive in EMR which is still valid.      Review of Systems   Constitutional:  Negative for chills, fatigue and fever.   HENT:  Negative for congestion, ear pain and sinus pressure.    Respiratory:  Negative for cough, chest tightness, shortness of breath and wheezing.    Cardiovascular:  Negative for chest pain and palpitations.   Gastrointestinal:  Negative for abdominal pain, blood in stool and constipation.   Skin:  Negative for color change.   Allergic/Immunologic: Negative for environmental allergies.   Neurological:  Negative for dizziness, speech difficulty and headaches.   Psychiatric/Behavioral:  Negative for confusion. The patient is not nervous/anxious.      Physical Exam       Results  Labs   - Urine drug screen: " Cannabis    Imaging   - CT scan of the head: Normal    Patient wellness counseling  Exercise:   Diet:   Screening:  Social History     Socioeconomic History    Marital status:    Tobacco Use    Smoking status: Former     Current packs/day: 0.00     Average packs/day: 0.3 packs/day for 20.0 years (5.0 ttl pk-yrs)     Types: Cigarettes     Start date: 10/1/1991     Quit date: 10/1/2011     Years since quittin.6    Smokeless tobacco: Never    Tobacco comments:     0.1 PPD for 20 years Quit 2012   Vaping Use    Vaping status: Never Used   Substance and Sexual Activity    Alcohol use: Yes     Comment: 8 wine or mixed drink    Drug use: Never    Sexual activity: Not Currently       Objective   Physical Exam  Vitals reviewed.   Constitutional:       Appearance: Normal appearance. She is well-developed.   HENT:      Head: Normocephalic and atraumatic.   Neck:      Thyroid: No thyromegaly.      Vascular: No carotid bruit.   Cardiovascular:      Rate and Rhythm: Normal rate and regular rhythm.      Heart sounds: Normal heart sounds. No murmur heard.     No friction rub. No gallop.   Pulmonary:      Effort: Pulmonary effort is normal.      Breath sounds: Normal breath sounds.   Musculoskeletal:      Cervical back: Normal range of motion and neck supple.      Right lower leg: No edema.      Left lower leg: No edema.   Lymphadenopathy:      Cervical: No cervical adenopathy.   Skin:     General: Skin is warm and dry.   Neurological:      Mental Status: She is alert and oriented to person, place, and time.      Cranial Nerves: No cranial nerve deficit.      Sensory: No sensory deficit.      Motor: No weakness.      Coordination: Coordination normal.      Gait: Gait normal.      Deep Tendon Reflexes: Reflexes normal.   Psychiatric:         Mood and Affect: Mood normal.         Behavior: Behavior normal.         Assessment & Plan   Assessment & Plan  1. Transient global amnesia.  - No evidence of any other  neurologic abnormality.  - Workup during the ER visit and overnight hospital stay yielded a normal CT scan of the head.   - The workup will be completed with an MRI of the brain and an EEG. If there are any questions or concerns, a referral to neurology will be made.  - Follow-up as previously scheduled.     Problem List Items Addressed This Visit    None  Visit Diagnoses         Transient global amnesia    -  Primary    Relevant Orders    MRI Brain With & Without Contrast    EEG                 There are no Patient Instructions on file for this visit.    Dilip Carrero MD     Patient or patient representative verbalized consent for the use of Ambient Listening during the visit with  Dilip Carrero MD for chart documentation. 5/27/2025  15:10 EDT

## 2025-05-28 ENCOUNTER — TELEPHONE (OUTPATIENT)
Dept: INTERNAL MEDICINE | Facility: CLINIC | Age: 70
End: 2025-05-28
Payer: MEDICARE

## 2025-05-28 RX ORDER — CONJUGATED ESTROGENS 0.62 MG/G
1 CREAM VAGINAL 2 TIMES WEEKLY
Qty: 30 G | Refills: 5 | Status: SHIPPED | OUTPATIENT
Start: 2025-05-29

## 2025-05-28 NOTE — TELEPHONE ENCOUNTER
Returned call to pharmacy, pharmacist had already found information needed and was able to get prescription approved for patient.

## 2025-05-28 NOTE — TELEPHONE ENCOUNTER
Caller: [employer] - [facility] [address] [place] [address] [phone] [facility] [phone]     Relationship: Pharmacy    Best call back number: [phone]    What was the call regarding: PATIENT IS TRYING TO GET A LOWER RATE ON HER PREMARIN. THE PHARMACY NEEDS CLINICAL QUESTIONS ANSWERED.    REFERENCE#: [id number]    NEEDS A 24 HOUR TURN AROUND TIME    Is it okay if the provider responds through MyChart: NO

## 2025-05-29 ENCOUNTER — TELEPHONE (OUTPATIENT)
Dept: INTERNAL MEDICINE | Facility: CLINIC | Age: 70
End: 2025-05-29
Payer: MEDICARE

## 2025-05-29 NOTE — TELEPHONE ENCOUNTER
Advised patient via my chart that I have received an approval notification from the insurance company for the PREMARIN VAGINAL CREAM. The approval dates are from 01/01/25 to 12/31/25. A copy of this letter will be scanned into the chart.

## 2025-06-02 ENCOUNTER — TRANSCRIBE ORDERS (OUTPATIENT)
Dept: ADMINISTRATIVE | Facility: HOSPITAL | Age: 70
End: 2025-06-02
Payer: MEDICARE

## 2025-06-02 DIAGNOSIS — N64.4 BREAST PAIN: Primary | ICD-10-CM

## 2025-06-05 ENCOUNTER — HOSPITAL ENCOUNTER (OUTPATIENT)
Dept: NEUROLOGY | Facility: HOSPITAL | Age: 70
Discharge: HOME | End: 2025-06-05
Admitting: INTERNAL MEDICINE
Payer: MEDICARE

## 2025-06-05 DIAGNOSIS — G45.4 TRANSIENT GLOBAL AMNESIA: ICD-10-CM

## 2025-06-05 PROCEDURE — 95816 EEG AWAKE AND DROWSY: CPT

## 2025-06-13 ENCOUNTER — HOSPITAL ENCOUNTER (OUTPATIENT)
Dept: MRI IMAGING | Facility: HOSPITAL | Age: 70
Discharge: HOME | End: 2025-06-13
Payer: MEDICARE

## 2025-06-13 DIAGNOSIS — G45.4 TRANSIENT GLOBAL AMNESIA: ICD-10-CM

## 2025-06-13 PROCEDURE — 70553 MRI BRAIN STEM W/O & W/DYE: CPT

## 2025-06-13 PROCEDURE — 25510000002 GADOBENATE DIMEGLUMINE 529 MG/ML SOLUTION: Performed by: INTERNAL MEDICINE

## 2025-06-13 PROCEDURE — A9577 INJ MULTIHANCE: HCPCS | Performed by: INTERNAL MEDICINE

## 2025-06-13 RX ADMIN — GADOBENATE DIMEGLUMINE 13 ML: 529 INJECTION, SOLUTION INTRAVENOUS at 13:22

## 2025-06-17 ENCOUNTER — HOSPITAL ENCOUNTER (OUTPATIENT)
Facility: HOSPITAL | Age: 70
Discharge: HOME | End: 2025-06-17
Payer: MEDICARE

## 2025-06-17 DIAGNOSIS — N64.4 BREAST PAIN: ICD-10-CM

## 2025-06-17 PROCEDURE — 77065 DX MAMMO INCL CAD UNI: CPT

## 2025-06-17 PROCEDURE — G0279 TOMOSYNTHESIS, MAMMO: HCPCS | Performed by: RADIOLOGY

## 2025-06-17 PROCEDURE — 76642 ULTRASOUND BREAST LIMITED: CPT

## 2025-06-17 PROCEDURE — G0279 TOMOSYNTHESIS, MAMMO: HCPCS

## 2025-06-17 PROCEDURE — 77065 DX MAMMO INCL CAD UNI: CPT | Performed by: RADIOLOGY

## 2025-06-17 PROCEDURE — 76642 ULTRASOUND BREAST LIMITED: CPT | Performed by: RADIOLOGY

## 2025-06-17 RX ORDER — LISINOPRIL 40 MG/1
40 TABLET ORAL DAILY
Qty: 90 TABLET | Refills: 3 | Status: SHIPPED | OUTPATIENT
Start: 2025-06-17

## 2025-06-17 NOTE — TELEPHONE ENCOUNTER
Rx Refill Note  Requested Prescriptions     Pending Prescriptions Disp Refills    lisinopril (PRINIVIL,ZESTRIL) 40 MG tablet [Pharmacy Med Name: LISINOPRIL 40 MG TABLET] 90 tablet 3     Sig: TAKE 1 TABLET BY MOUTH DAILY      Last office visit with prescribing clinician: 5/27/2025   Last telemedicine visit with prescribing clinician: Visit date not found   Next office visit with prescribing clinician: 2/23/2026                         Would you like a call back once the refill request has been completed: [] Yes [] No    If the office needs to give you a call back, can they leave a voicemail: [] Yes [] No    Anali Bass MA  06/17/25, 10:32 EDT

## 2025-06-30 RX ORDER — AZITHROMYCIN 250 MG/1
TABLET, FILM COATED ORAL
Qty: 6 TABLET | Refills: 0 | Status: SHIPPED | OUTPATIENT
Start: 2025-06-30

## 2025-07-22 RX ORDER — ROSUVASTATIN CALCIUM 10 MG/1
10 TABLET, COATED ORAL DAILY
Qty: 90 TABLET | Refills: 1 | Status: SHIPPED | OUTPATIENT
Start: 2025-07-22

## 2025-07-22 RX ORDER — METOPROLOL SUCCINATE 50 MG/1
50 TABLET, EXTENDED RELEASE ORAL DAILY
Qty: 90 TABLET | Refills: 3 | Status: SHIPPED | OUTPATIENT
Start: 2025-07-22

## 2025-08-05 ENCOUNTER — HOSPITAL ENCOUNTER (OUTPATIENT)
Dept: BONE DENSITY | Facility: HOSPITAL | Age: 70
Discharge: HOME | End: 2025-08-05
Admitting: INTERNAL MEDICINE
Payer: MEDICARE

## 2025-08-05 DIAGNOSIS — Z78.0 MENOPAUSE: ICD-10-CM

## 2025-08-05 PROCEDURE — 77080 DXA BONE DENSITY AXIAL: CPT

## 2025-08-26 LAB
NCCN CRITERIA FLAG: NORMAL
TYRER CUZICK SCORE: 4.7